# Patient Record
Sex: MALE | Race: WHITE | NOT HISPANIC OR LATINO | URBAN - METROPOLITAN AREA
[De-identification: names, ages, dates, MRNs, and addresses within clinical notes are randomized per-mention and may not be internally consistent; named-entity substitution may affect disease eponyms.]

---

## 2017-03-30 ENCOUNTER — INPATIENT (INPATIENT)
Facility: HOSPITAL | Age: 82
LOS: 7 days | Discharge: HOME | DRG: 85 | End: 2017-04-07
Attending: INTERNAL MEDICINE | Admitting: INTERNAL MEDICINE
Payer: MEDICARE

## 2017-03-30 VITALS
OXYGEN SATURATION: 94 % | SYSTOLIC BLOOD PRESSURE: 163 MMHG | TEMPERATURE: 98 F | DIASTOLIC BLOOD PRESSURE: 71 MMHG | RESPIRATION RATE: 20 BRPM | HEART RATE: 60 BPM

## 2017-03-30 DIAGNOSIS — Z98.890 OTHER SPECIFIED POSTPROCEDURAL STATES: Chronic | ICD-10-CM

## 2017-03-30 DIAGNOSIS — Z95.1 PRESENCE OF AORTOCORONARY BYPASS GRAFT: Chronic | ICD-10-CM

## 2017-03-30 DIAGNOSIS — R29.6 REPEATED FALLS: ICD-10-CM

## 2017-03-30 PROBLEM — Z00.00 ENCOUNTER FOR PREVENTIVE HEALTH EXAMINATION: Status: ACTIVE | Noted: 2017-03-30

## 2017-03-30 LAB
ALBUMIN SERPL ELPH-MCNC: 4.4 G/DL — SIGNIFICANT CHANGE UP (ref 3.3–5)
ALP SERPL-CCNC: 95 U/L — SIGNIFICANT CHANGE UP (ref 40–120)
ALT FLD-CCNC: 34 U/L RC — SIGNIFICANT CHANGE UP (ref 10–45)
ANION GAP SERPL CALC-SCNC: 14 MMOL/L — SIGNIFICANT CHANGE UP (ref 5–17)
APTT BLD: 38.1 SEC — HIGH (ref 27.5–37.4)
AST SERPL-CCNC: 28 U/L — SIGNIFICANT CHANGE UP (ref 10–40)
BASOPHILS # BLD AUTO: 0 K/UL — SIGNIFICANT CHANGE UP (ref 0–0.2)
BASOPHILS NFR BLD AUTO: 0.1 % — SIGNIFICANT CHANGE UP (ref 0–2)
BILIRUB SERPL-MCNC: 1.5 MG/DL — HIGH (ref 0.2–1.2)
BUN SERPL-MCNC: 27 MG/DL — HIGH (ref 7–23)
CALCIUM SERPL-MCNC: 9.6 MG/DL — SIGNIFICANT CHANGE UP (ref 8.4–10.5)
CHLORIDE SERPL-SCNC: 95 MMOL/L — LOW (ref 96–108)
CO2 SERPL-SCNC: 24 MMOL/L — SIGNIFICANT CHANGE UP (ref 22–31)
CREAT SERPL-MCNC: 1.2 MG/DL — SIGNIFICANT CHANGE UP (ref 0.5–1.3)
EOSINOPHIL # BLD AUTO: 0.2 K/UL — SIGNIFICANT CHANGE UP (ref 0–0.5)
EOSINOPHIL NFR BLD AUTO: 1.9 % — SIGNIFICANT CHANGE UP (ref 0–6)
GAS PNL BLDV: SIGNIFICANT CHANGE UP
GLUCOSE SERPL-MCNC: 125 MG/DL — HIGH (ref 70–99)
HCT VFR BLD CALC: 41.3 % — SIGNIFICANT CHANGE UP (ref 39–50)
HGB BLD-MCNC: 14.3 G/DL — SIGNIFICANT CHANGE UP (ref 13–17)
INR BLD: 2.68 RATIO — HIGH (ref 0.88–1.16)
LYMPHOCYTES # BLD AUTO: 0.5 K/UL — LOW (ref 1–3.3)
LYMPHOCYTES # BLD AUTO: 5.8 % — LOW (ref 13–44)
MAGNESIUM SERPL-MCNC: 2.8 MG/DL — HIGH (ref 1.6–2.6)
MCHC RBC-ENTMCNC: 33.6 PG — SIGNIFICANT CHANGE UP (ref 27–34)
MCHC RBC-ENTMCNC: 34.6 GM/DL — SIGNIFICANT CHANGE UP (ref 32–36)
MCV RBC AUTO: 97.1 FL — SIGNIFICANT CHANGE UP (ref 80–100)
MONOCYTES # BLD AUTO: 0.8 K/UL — SIGNIFICANT CHANGE UP (ref 0–0.9)
MONOCYTES NFR BLD AUTO: 8.5 % — SIGNIFICANT CHANGE UP (ref 2–14)
NEUTROPHILS # BLD AUTO: 7.7 K/UL — HIGH (ref 1.8–7.4)
NEUTROPHILS NFR BLD AUTO: 83.7 % — HIGH (ref 43–77)
PHOSPHATE SERPL-MCNC: 2.5 MG/DL — SIGNIFICANT CHANGE UP (ref 2.5–4.5)
PLATELET # BLD AUTO: 116 K/UL — LOW (ref 150–400)
POTASSIUM SERPL-MCNC: 4.1 MMOL/L — SIGNIFICANT CHANGE UP (ref 3.5–5.3)
POTASSIUM SERPL-SCNC: 4.1 MMOL/L — SIGNIFICANT CHANGE UP (ref 3.5–5.3)
PROT SERPL-MCNC: 7.1 G/DL — SIGNIFICANT CHANGE UP (ref 6–8.3)
PROTHROM AB SERPL-ACNC: 29.8 SEC — HIGH (ref 9.8–12.7)
RBC # BLD: 4.26 M/UL — SIGNIFICANT CHANGE UP (ref 4.2–5.8)
RBC # FLD: 12.5 % — SIGNIFICANT CHANGE UP (ref 10.3–14.5)
SODIUM SERPL-SCNC: 133 MMOL/L — LOW (ref 135–145)
WBC # BLD: 9.2 K/UL — SIGNIFICANT CHANGE UP (ref 3.8–10.5)
WBC # FLD AUTO: 9.2 K/UL — SIGNIFICANT CHANGE UP (ref 3.8–10.5)

## 2017-03-30 PROCEDURE — 93010 ELECTROCARDIOGRAM REPORT: CPT | Mod: GC

## 2017-03-30 PROCEDURE — 99285 EMERGENCY DEPT VISIT HI MDM: CPT | Mod: 25,GC

## 2017-03-30 PROCEDURE — 72125 CT NECK SPINE W/O DYE: CPT | Mod: 26

## 2017-03-30 PROCEDURE — 70450 CT HEAD/BRAIN W/O DYE: CPT | Mod: 26

## 2017-03-30 PROCEDURE — 71010: CPT | Mod: 26

## 2017-03-30 RX ORDER — ACETAMINOPHEN 500 MG
1000 TABLET ORAL ONCE
Qty: 0 | Refills: 0 | Status: COMPLETED | OUTPATIENT
Start: 2017-03-30 | End: 2017-03-30

## 2017-03-30 RX ORDER — LOSARTAN POTASSIUM 100 MG/1
1 TABLET, FILM COATED ORAL
Qty: 0 | Refills: 0 | COMMUNITY

## 2017-03-30 RX ORDER — LOSARTAN POTASSIUM 100 MG/1
0 TABLET, FILM COATED ORAL
Qty: 0 | Refills: 0 | COMMUNITY

## 2017-03-30 RX ORDER — BUDESONIDE AND FORMOTEROL FUMARATE DIHYDRATE 160; 4.5 UG/1; UG/1
0 AEROSOL RESPIRATORY (INHALATION)
Qty: 0 | Refills: 0 | COMMUNITY

## 2017-03-30 RX ORDER — METOPROLOL TARTRATE 50 MG
0 TABLET ORAL
Qty: 0 | Refills: 0 | COMMUNITY

## 2017-03-30 RX ORDER — HYDRALAZINE HCL 50 MG
0 TABLET ORAL
Qty: 0 | Refills: 0 | COMMUNITY

## 2017-03-30 RX ORDER — SODIUM CHLORIDE 9 MG/ML
1000 INJECTION INTRAMUSCULAR; INTRAVENOUS; SUBCUTANEOUS ONCE
Qty: 0 | Refills: 0 | Status: COMPLETED | OUTPATIENT
Start: 2017-03-30 | End: 2017-03-30

## 2017-03-30 RX ORDER — ASPIRIN/CALCIUM CARB/MAGNESIUM 324 MG
0 TABLET ORAL
Qty: 0 | Refills: 0 | COMMUNITY

## 2017-03-30 RX ORDER — WARFARIN SODIUM 2.5 MG/1
0 TABLET ORAL
Qty: 0 | Refills: 0 | COMMUNITY

## 2017-03-30 RX ORDER — TERAZOSIN HYDROCHLORIDE 10 MG/1
0 CAPSULE ORAL
Qty: 0 | Refills: 0 | COMMUNITY

## 2017-03-30 RX ORDER — SIMVASTATIN 20 MG/1
0 TABLET, FILM COATED ORAL
Qty: 0 | Refills: 0 | COMMUNITY

## 2017-03-30 RX ORDER — RANITIDINE HYDROCHLORIDE 150 MG/1
0 TABLET, FILM COATED ORAL
Qty: 0 | Refills: 0 | COMMUNITY

## 2017-03-30 RX ADMIN — Medication 400 MILLIGRAM(S): at 21:58

## 2017-03-30 RX ADMIN — SODIUM CHLORIDE 2000 MILLILITER(S): 9 INJECTION INTRAMUSCULAR; INTRAVENOUS; SUBCUTANEOUS at 21:58

## 2017-03-30 RX ADMIN — Medication 1000 MILLIGRAM(S): at 23:04

## 2017-03-30 NOTE — ED PROVIDER NOTE - MEDICAL DECISION MAKING DETAILS
86 year old male with history of AFib, CAD, on coumadin, presenting with frequent falls since yesterday, on exam is alert and oriented with no focal deficits and bruising to top of head, will check CT, labs, and urine, reassess 86 year old male with history of AFib, CAD, on coumadin, presenting with frequent falls since yesterday, on exam is alert and oriented with no focal deficits and bruising to top of head, will check CT, labs, and urine, reassess / ATTD agree with abvoe.

## 2017-03-30 NOTE — ED ADULT NURSE NOTE - PMH
COPD (chronic obstructive pulmonary disease)    Heart murmur    High cholesterol    HTN (hypertension)    Pacemaker    Stented coronary artery

## 2017-03-30 NOTE — ED ADULT NURSE NOTE - OBJECTIVE STATEMENT
as per pt family, wife states, " last week pt started having shooting head pains and yesterday fell while walking in the house. today pt fell an additional x3 times and hit the back of the head while trying to get up." pt is on warfarin for afib. pt feels dizzy and lightheaded at present with blurry vision in both eyes. pt walks with unsteady gait. pt denies LOC

## 2017-03-30 NOTE — ED PROVIDER NOTE - ATTENDING CONTRIBUTION TO CARE
87 y/o m with pmhx HTN, BPH, HLD, Afib on coumadin, CAD s/p stents, presents with 3 falls over the last 2 days/ came from [place] where he lives for a wedding. Saw his PMD in [place] a few days ago who thought the etiology of headache was from neck stiffness, had his INR checked which was low and his coumadin dosage increased.  Per wife and daughter at bedside, he saw the grain in a wooden table and thought it was ants, he fell 3 times today and then once today, not sure if he hit his head. Complaining of occipital headache and dizziness. Denies chest pain, no dyspnea, no nausea. Cardiologist Melvin Damico no other complaints.   Gen. no acute distress, Non toxic GCS 15. primary and secondary survey done.    HEENT: EOMI, mmm, area of ecchymosis on post scalp superficial. no step off or tend. no Racoon no Merida sign, no hemotympanum neck supple and full range of motion.   Lungs: CTAB/L no C/ W /R   CVS: S1S2   Abd; Soft non tender, non distended   Ext: no edema , superficial abrasion on right leg.   Neuro AAOx3 non focal clear speech

## 2017-03-30 NOTE — ED PROVIDER NOTE - OBJECTIVE STATEMENT
86 year old male with PMHx of HTN, BPH, HLD, Afib on coumadin, CAD s/p stents, presenting with frequent falls at home. Has been having headaches for about a week, saw his PMD in [place] a few days ago who thought the etiology was from neck stiffness, had his INR checked which was low and his coumadin dosage increased. He has been acting "not right" per family (at bedside) for the past few days - he saw the grain in a wooden table and thought it was ants, he fell 3 times today and then once today, not sure if he hit his head. Complaining of occipital headache and dizziness. Denies chest pain, no dyspnea, no nausea. Cardiologist Melvin Damico

## 2017-03-31 DIAGNOSIS — I48.0 PAROXYSMAL ATRIAL FIBRILLATION: ICD-10-CM

## 2017-03-31 DIAGNOSIS — R29.6 REPEATED FALLS: ICD-10-CM

## 2017-03-31 DIAGNOSIS — I10 ESSENTIAL (PRIMARY) HYPERTENSION: ICD-10-CM

## 2017-03-31 DIAGNOSIS — R51 HEADACHE: ICD-10-CM

## 2017-03-31 DIAGNOSIS — J44.9 CHRONIC OBSTRUCTIVE PULMONARY DISEASE, UNSPECIFIED: ICD-10-CM

## 2017-03-31 DIAGNOSIS — Z95.0 PRESENCE OF CARDIAC PACEMAKER: ICD-10-CM

## 2017-03-31 LAB
ALBUMIN SERPL ELPH-MCNC: 4.2 G/DL — SIGNIFICANT CHANGE UP (ref 3.3–5)
ALP SERPL-CCNC: 78 U/L — SIGNIFICANT CHANGE UP (ref 40–120)
ALT FLD-CCNC: 31 U/L RC — SIGNIFICANT CHANGE UP (ref 10–45)
ANION GAP SERPL CALC-SCNC: 15 MMOL/L — SIGNIFICANT CHANGE UP (ref 5–17)
APPEARANCE UR: CLEAR — SIGNIFICANT CHANGE UP
APPEARANCE UR: CLEAR — SIGNIFICANT CHANGE UP
APTT BLD: 38 SEC — HIGH (ref 27.5–37.4)
AST SERPL-CCNC: 32 U/L — SIGNIFICANT CHANGE UP (ref 10–40)
BASOPHILS # BLD AUTO: 0 K/UL — SIGNIFICANT CHANGE UP (ref 0–0.2)
BASOPHILS NFR BLD AUTO: 0.2 % — SIGNIFICANT CHANGE UP (ref 0–2)
BILIRUB SERPL-MCNC: 1.3 MG/DL — HIGH (ref 0.2–1.2)
BILIRUB UR-MCNC: NEGATIVE — SIGNIFICANT CHANGE UP
BILIRUB UR-MCNC: NEGATIVE — SIGNIFICANT CHANGE UP
BUN SERPL-MCNC: 36 MG/DL — HIGH (ref 7–23)
CALCIUM SERPL-MCNC: 8.9 MG/DL — SIGNIFICANT CHANGE UP (ref 8.4–10.5)
CHLORIDE SERPL-SCNC: 97 MMOL/L — SIGNIFICANT CHANGE UP (ref 96–108)
CK MB BLD-MCNC: 1.6 % — SIGNIFICANT CHANGE UP (ref 0–3.5)
CK MB BLD-MCNC: 3.2 % — SIGNIFICANT CHANGE UP (ref 0–3.5)
CK MB CFR SERPL CALC: 7.1 NG/ML — HIGH (ref 0–6.7)
CK MB CFR SERPL CALC: 8.4 NG/ML — HIGH (ref 0–6.7)
CK MB CFR SERPL CALC: 8.7 NG/ML — HIGH (ref 0–6.7)
CK SERPL-CCNC: 262 U/L — HIGH (ref 30–200)
CK SERPL-CCNC: 291 U/L — HIGH (ref 30–200)
CK SERPL-CCNC: 442 U/L — HIGH (ref 30–200)
CO2 SERPL-SCNC: 21 MMOL/L — LOW (ref 22–31)
COLOR SPEC: YELLOW — SIGNIFICANT CHANGE UP
COLOR SPEC: YELLOW — SIGNIFICANT CHANGE UP
CREAT SERPL-MCNC: 1.13 MG/DL — SIGNIFICANT CHANGE UP (ref 0.5–1.3)
DIFF PNL FLD: ABNORMAL
DIFF PNL FLD: ABNORMAL
EOSINOPHIL # BLD AUTO: 0 K/UL — SIGNIFICANT CHANGE UP (ref 0–0.5)
EOSINOPHIL NFR BLD AUTO: 0.2 % — SIGNIFICANT CHANGE UP (ref 0–6)
EPI CELLS # UR: SIGNIFICANT CHANGE UP /HPF
GLUCOSE SERPL-MCNC: 153 MG/DL — HIGH (ref 70–99)
GLUCOSE UR QL: NEGATIVE — SIGNIFICANT CHANGE UP
GLUCOSE UR QL: NEGATIVE — SIGNIFICANT CHANGE UP
HCT VFR BLD CALC: 38.3 % — LOW (ref 39–50)
HGB BLD-MCNC: 13.7 G/DL — SIGNIFICANT CHANGE UP (ref 13–17)
INR BLD: 2.38 RATIO — HIGH (ref 0.88–1.16)
KETONES UR-MCNC: NEGATIVE — SIGNIFICANT CHANGE UP
KETONES UR-MCNC: NEGATIVE — SIGNIFICANT CHANGE UP
LACTATE SERPL-SCNC: 1.4 MMOL/L — SIGNIFICANT CHANGE UP (ref 0.7–2)
LEUKOCYTE ESTERASE UR-ACNC: NEGATIVE — SIGNIFICANT CHANGE UP
LEUKOCYTE ESTERASE UR-ACNC: NEGATIVE — SIGNIFICANT CHANGE UP
LYMPHOCYTES # BLD AUTO: 0.6 K/UL — LOW (ref 1–3.3)
LYMPHOCYTES # BLD AUTO: 5 % — LOW (ref 13–44)
MAGNESIUM SERPL-MCNC: 2.2 MG/DL — SIGNIFICANT CHANGE UP (ref 1.6–2.6)
MCHC RBC-ENTMCNC: 34.2 PG — HIGH (ref 27–34)
MCHC RBC-ENTMCNC: 35.7 GM/DL — SIGNIFICANT CHANGE UP (ref 32–36)
MCV RBC AUTO: 96 FL — SIGNIFICANT CHANGE UP (ref 80–100)
MONOCYTES # BLD AUTO: 1 K/UL — HIGH (ref 0–0.9)
MONOCYTES NFR BLD AUTO: 8.2 % — SIGNIFICANT CHANGE UP (ref 2–14)
NEUTROPHILS # BLD AUTO: 10.3 K/UL — HIGH (ref 1.8–7.4)
NEUTROPHILS NFR BLD AUTO: 86.4 % — HIGH (ref 43–77)
NITRITE UR-MCNC: NEGATIVE — SIGNIFICANT CHANGE UP
NITRITE UR-MCNC: NEGATIVE — SIGNIFICANT CHANGE UP
PH UR: 6 — SIGNIFICANT CHANGE UP (ref 4.8–8)
PH UR: 6 — SIGNIFICANT CHANGE UP (ref 4.8–8)
PLATELET # BLD AUTO: 104 K/UL — LOW (ref 150–400)
POTASSIUM SERPL-MCNC: 4.6 MMOL/L — SIGNIFICANT CHANGE UP (ref 3.5–5.3)
POTASSIUM SERPL-SCNC: 4.6 MMOL/L — SIGNIFICANT CHANGE UP (ref 3.5–5.3)
PROT SERPL-MCNC: 6.3 G/DL — SIGNIFICANT CHANGE UP (ref 6–8.3)
PROT UR-MCNC: 100 MG/DL
PROT UR-MCNC: 300 MG/DL
PROTHROM AB SERPL-ACNC: 27.4 SEC — HIGH (ref 10–13.1)
RBC # BLD: 3.99 M/UL — LOW (ref 4.2–5.8)
RBC # FLD: 13 % — SIGNIFICANT CHANGE UP (ref 10.3–14.5)
RBC CASTS # UR COMP ASSIST: ABNORMAL /HPF (ref 0–2)
SODIUM SERPL-SCNC: 133 MMOL/L — LOW (ref 135–145)
SP GR SPEC: 1.02 — SIGNIFICANT CHANGE UP (ref 1.01–1.02)
SP GR SPEC: 1.03 — HIGH (ref 1.01–1.02)
TROPONIN T SERPL-MCNC: 0.02 NG/ML — SIGNIFICANT CHANGE UP (ref 0–0.06)
TROPONIN T SERPL-MCNC: <0.01 NG/ML — SIGNIFICANT CHANGE UP (ref 0–0.06)
TROPONIN T SERPL-MCNC: <0.01 NG/ML — SIGNIFICANT CHANGE UP (ref 0–0.06)
UROBILINOGEN FLD QL: NEGATIVE — SIGNIFICANT CHANGE UP
UROBILINOGEN FLD QL: NEGATIVE — SIGNIFICANT CHANGE UP
WBC # BLD: 11.9 K/UL — HIGH (ref 3.8–10.5)
WBC # FLD AUTO: 11.9 K/UL — HIGH (ref 3.8–10.5)
WBC UR QL: SIGNIFICANT CHANGE UP /HPF (ref 0–5)

## 2017-03-31 PROCEDURE — 99223 1ST HOSP IP/OBS HIGH 75: CPT

## 2017-03-31 RX ORDER — LOSARTAN POTASSIUM 100 MG/1
25 TABLET, FILM COATED ORAL
Qty: 0 | Refills: 0 | Status: DISCONTINUED | OUTPATIENT
Start: 2017-03-31 | End: 2017-04-01

## 2017-03-31 RX ORDER — ACETAMINOPHEN 500 MG
650 TABLET ORAL ONCE
Qty: 0 | Refills: 0 | Status: COMPLETED | OUTPATIENT
Start: 2017-03-31 | End: 2017-03-31

## 2017-03-31 RX ORDER — BUDESONIDE AND FORMOTEROL FUMARATE DIHYDRATE 160; 4.5 UG/1; UG/1
2 AEROSOL RESPIRATORY (INHALATION)
Qty: 0 | Refills: 0 | Status: DISCONTINUED | OUTPATIENT
Start: 2017-03-31 | End: 2017-04-07

## 2017-03-31 RX ORDER — BUDESONIDE AND FORMOTEROL FUMARATE DIHYDRATE 160; 4.5 UG/1; UG/1
2 AEROSOL RESPIRATORY (INHALATION)
Qty: 0 | Refills: 0 | COMMUNITY

## 2017-03-31 RX ORDER — METOPROLOL TARTRATE 50 MG
25 TABLET ORAL AT BEDTIME
Qty: 0 | Refills: 0 | Status: DISCONTINUED | OUTPATIENT
Start: 2017-03-31 | End: 2017-04-02

## 2017-03-31 RX ORDER — ACETAMINOPHEN 500 MG
650 TABLET ORAL EVERY 6 HOURS
Qty: 0 | Refills: 0 | Status: DISCONTINUED | OUTPATIENT
Start: 2017-03-31 | End: 2017-04-07

## 2017-03-31 RX ORDER — HYDRALAZINE HCL 50 MG
50 TABLET ORAL
Qty: 0 | Refills: 0 | Status: DISCONTINUED | OUTPATIENT
Start: 2017-03-31 | End: 2017-04-01

## 2017-03-31 RX ORDER — FAMOTIDINE 10 MG/ML
20 INJECTION INTRAVENOUS DAILY
Qty: 0 | Refills: 0 | Status: DISCONTINUED | OUTPATIENT
Start: 2017-03-31 | End: 2017-04-07

## 2017-03-31 RX ORDER — CARBIDOPA AND LEVODOPA 25; 100 MG/1; MG/1
0.5 TABLET ORAL
Qty: 0 | Refills: 0 | Status: DISCONTINUED | OUTPATIENT
Start: 2017-03-31 | End: 2017-04-05

## 2017-03-31 RX ORDER — SIMVASTATIN 20 MG/1
40 TABLET, FILM COATED ORAL AT BEDTIME
Qty: 0 | Refills: 0 | Status: DISCONTINUED | OUTPATIENT
Start: 2017-03-31 | End: 2017-04-07

## 2017-03-31 RX ORDER — DOXAZOSIN MESYLATE 4 MG
2 TABLET ORAL AT BEDTIME
Qty: 0 | Refills: 0 | Status: DISCONTINUED | OUTPATIENT
Start: 2017-03-31 | End: 2017-04-07

## 2017-03-31 RX ORDER — ASPIRIN/CALCIUM CARB/MAGNESIUM 324 MG
81 TABLET ORAL DAILY
Qty: 0 | Refills: 0 | Status: DISCONTINUED | OUTPATIENT
Start: 2017-03-31 | End: 2017-04-04

## 2017-03-31 RX ADMIN — Medication 25 MILLIGRAM(S): at 22:58

## 2017-03-31 RX ADMIN — Medication 50 MILLIGRAM(S): at 17:49

## 2017-03-31 RX ADMIN — FAMOTIDINE 20 MILLIGRAM(S): 10 INJECTION INTRAVENOUS at 11:45

## 2017-03-31 RX ADMIN — BUDESONIDE AND FORMOTEROL FUMARATE DIHYDRATE 2 PUFF(S): 160; 4.5 AEROSOL RESPIRATORY (INHALATION) at 17:49

## 2017-03-31 RX ADMIN — Medication 2 MILLIGRAM(S): at 22:57

## 2017-03-31 RX ADMIN — LOSARTAN POTASSIUM 25 MILLIGRAM(S): 100 TABLET, FILM COATED ORAL at 06:36

## 2017-03-31 RX ADMIN — LOSARTAN POTASSIUM 25 MILLIGRAM(S): 100 TABLET, FILM COATED ORAL at 17:49

## 2017-03-31 RX ADMIN — Medication 81 MILLIGRAM(S): at 11:45

## 2017-03-31 RX ADMIN — SIMVASTATIN 40 MILLIGRAM(S): 20 TABLET, FILM COATED ORAL at 22:57

## 2017-03-31 RX ADMIN — BUDESONIDE AND FORMOTEROL FUMARATE DIHYDRATE 2 PUFF(S): 160; 4.5 AEROSOL RESPIRATORY (INHALATION) at 06:35

## 2017-03-31 RX ADMIN — CARBIDOPA AND LEVODOPA 0.5 TABLET(S): 25; 100 TABLET ORAL at 22:58

## 2017-03-31 RX ADMIN — Medication 650 MILLIGRAM(S): at 21:14

## 2017-03-31 RX ADMIN — Medication 50 MILLIGRAM(S): at 06:36

## 2017-03-31 NOTE — PHYSICAL THERAPY INITIAL EVALUATION ADULT - PLANNED THERAPY INTERVENTIONS, PT EVAL
strengthening/bed mobility training/transfer training/gait training/postural re-education/balance training

## 2017-03-31 NOTE — H&P ADULT. - LAB RESULTS AND INTERPRETATION
WBC 9.2.  Hgb 14.3.  Platelets of 116K.  INR 2.6.  K+ 4.1.  Random glucose of 125.  Cr 1.2.  Alb 4.4  Mg++ 2.8.

## 2017-03-31 NOTE — PHYSICAL THERAPY INITIAL EVALUATION ADULT - PERTINENT HX OF CURRENT PROBLEM, REHAB EVAL
87 y/o M previously a resident of [place] but has resided in [place] for the past 10 years with a history of remote CABG in [place], PAF on Coumadin, past PCI, PPM, reported COPD (no prior primary or secondary tobacco exposure) but from a childhood complication of empyema, with the spouse noting that patient has been complaining of an occipital H/A since this Sunday, and 4 falls in past 2 days 85 y/o M previously a resident of [place] but has resided in [place] for the past 10 years with a history of remote CABG in [place], PAF on Coumadin, past PCI, PPM, reported COPD (no prior primary or secondary tobacco exposure) but from a childhood complication of empyema, with the spouse noting that patient has been complaining of an occipital H/A since this Sunday, and 4 falls in past 2 days. CT head/Cspine (3/30): (-)

## 2017-03-31 NOTE — PHYSICAL THERAPY INITIAL EVALUATION ADULT - ADDITIONAL COMMENTS
Pt lives with spouse in [place], is currently in [place] for a wedding, staying at daughter's house. Pt's daughter lives in a ranch with 2-3 stairs to enter (+) HR. Pt ambulates with a rolling walker at baseline.

## 2017-03-31 NOTE — H&P ADULT. - HISTORY OF PRESENT ILLNESS
NIGHT HOSPITALIST:  Patient UNKNOWN to me previously, assigned to me via the ER and by Dr. Sawyer to admit this 87 y/o M--history corroborated with the spouse via telephone --patient previously a resident of [place] but has resided in [place] for the past 10 years with a history of remote CABG in [place], [facility] on Coumadin, past PCI, PPM, reported COPD (no prior primary or secondary tobacco exposure) but from a childhood complication of empyema, with the spouse noting that patient has been complaining of an occipital H/A since this Sunday, with the patient and her spouse arriving in [place] this Tuesday for a local wedding.  Patient reports that he fell 2 days ago and yesterday a total of 4 times--spouse notes that the third time the patient fell on the back of his head, and with falls to his RIGHT leg.  Patient denies LOC.  NO N/V.  No HA at present, no focal weakness.  Spouse notes that the last episode yesterday patient had fallen in the [place] and patient had accidentally blocked himself against the door but was able to free himself from the door.  No chest pain/pressure.  No dyspnea.  No abdominal pain, no red blood per rectum.  No back pain, no tearing back pain.  No fever, no chills, no rigors.  No dysuria.  No hematuria.  Denies weight loss or anorexia.  Remaining review of systems not contributory.

## 2017-03-31 NOTE — H&P ADULT. - EYES COMMENTS
No Merida's.  No raccoon eye. No Merida's.  No raccoon eye.  3 cm shallow ecchymoses posterior aspect scalp.

## 2017-03-31 NOTE — H&P ADULT. - RADIOLOGY RESULTS AND INTERPRETATION
Chest radiograph with PPM, no infiltrate or effusion.  Reviewed head CTT and cervical CTT with radiologist with no bleed, no mass, no acute CVA.

## 2017-03-31 NOTE — H&P ADULT. - RS GEN PE MLT RESP DETAILS PC
no intercostal retractions/no chest wall tenderness/no wheezes/no rhonchi/respirations non-labored/no rales/clear to auscultation bilaterally/good air movement/airway patent/breath sounds equal/no subcutaneous emphysema

## 2017-03-31 NOTE — H&P ADULT. - GASTROINTESTINAL DETAILS
no bruit/soft/bowel sounds normal/no guarding/no distention/no rebound tenderness/nontender/no rigidity/no organomegaly

## 2017-03-31 NOTE — H&P ADULT. - MENTAL STATUS
Alert, oriented to person/place/year.  Poor short term memory recall.  Interactive with examiner with no need for prompting but limited cognitive assessment.

## 2017-03-31 NOTE — INPATIENT CERTIFICATION FOR MEDICARE PATIENTS - RISKS OF ADVERSE EVENTS
Concern for neurologic deterioration/Concern for cardiopulmonary deterioration/Concern for delay in diagnosis and treatment

## 2017-03-31 NOTE — H&P ADULT. - ASSESSMENT
NIGHT HOSPITALIST;  Presentation of patient with intermittent occipital H/A since this past Sunday (no reported prior trauma until 2 days prior) in the setting of patient with PAF on Coumadin, CABG, PPM, essential HTN on multiple medications for BP control.  Patient had already received his Coumadin dose from last night by spouse--spouse aware and agrees to risks/benefits of continued anticoagulation in the setting of PAF-- I have requested a formal neurologic evaluation overnight.  MRI is precluded due to patient's PPM.  Would consider EP assessment of PPM.  Patient with no Rx that should explain the confusional state nor presently have evidence of occult infection as such>>will follow orthostatics in the setting of BP medications.  Will assume aspiration risk.  Physical therapy/ fall and bleeding precautions.  Enhanced supervision.  Will obtain an echo to assess aortic valves and LV function.  Patient's multiple comorbidities in patient's presentation with guarded prognosis.  Emotional support provided to patient/ spouse.  Spouse / family aware of course and agree with plan/care as above.

## 2017-03-31 NOTE — H&P ADULT. - PROBLEM SELECTOR PLAN 3
ON Coumadin.  Spouse/patient presently agree to continue with anticoagulation as above but will defer to the DAY HOSPITALIST further review of risk/benefits in the setting of the recent frequent falling.

## 2017-03-31 NOTE — PHYSICAL THERAPY INITIAL EVALUATION ADULT - DIAGNOSIS, PT EVAL
Decr. functional mobility 2/2 decr. balance, postural control; cognitive impairment, gait dysfunction

## 2017-03-31 NOTE — H&P ADULT. - ATTENDING COMMENTS
NIGHT HOSPITALIST:  Spouse/ family aware of course and agree with plan/care as above.  Prognosis guarded.  Emotional support provided to patient/ spouse.  Care reviewed with covering NP.  Care assumed by Dr. Sawyer.

## 2017-03-31 NOTE — PHYSICAL THERAPY INITIAL EVALUATION ADULT - GENERAL OBSERVATIONS, REHAB EVAL
Pt received seated in chair, (+) telemetry, NAD. Pt's spouse & daughter at bedside. Pt alert, confused, agreeable to PT eval.

## 2017-04-01 LAB
ANION GAP SERPL CALC-SCNC: 15 MMOL/L — SIGNIFICANT CHANGE UP (ref 5–17)
APTT BLD: 37.9 SEC — HIGH (ref 27.5–37.4)
BUN SERPL-MCNC: 33 MG/DL — HIGH (ref 7–23)
CALCIUM SERPL-MCNC: 8.6 MG/DL — SIGNIFICANT CHANGE UP (ref 8.4–10.5)
CHLORIDE SERPL-SCNC: 98 MMOL/L — SIGNIFICANT CHANGE UP (ref 96–108)
CO2 SERPL-SCNC: 20 MMOL/L — LOW (ref 22–31)
CREAT SERPL-MCNC: 1.02 MG/DL — SIGNIFICANT CHANGE UP (ref 0.5–1.3)
CULTURE RESULTS: SIGNIFICANT CHANGE UP
GLUCOSE SERPL-MCNC: 118 MG/DL — HIGH (ref 70–99)
HCT VFR BLD CALC: 41 % — SIGNIFICANT CHANGE UP (ref 39–50)
HGB BLD-MCNC: 14.1 G/DL — SIGNIFICANT CHANGE UP (ref 13–17)
INR BLD: 2.7 RATIO — HIGH (ref 0.88–1.16)
LACTATE SERPL-SCNC: 1.1 MMOL/L — SIGNIFICANT CHANGE UP (ref 0.7–2)
MCHC RBC-ENTMCNC: 33.5 PG — SIGNIFICANT CHANGE UP (ref 27–34)
MCHC RBC-ENTMCNC: 34.4 GM/DL — SIGNIFICANT CHANGE UP (ref 32–36)
MCV RBC AUTO: 97.4 FL — SIGNIFICANT CHANGE UP (ref 80–100)
PLATELET # BLD AUTO: 96 K/UL — LOW (ref 150–400)
POTASSIUM SERPL-MCNC: 4 MMOL/L — SIGNIFICANT CHANGE UP (ref 3.5–5.3)
POTASSIUM SERPL-SCNC: 4 MMOL/L — SIGNIFICANT CHANGE UP (ref 3.5–5.3)
PROTHROM AB SERPL-ACNC: 29.8 SEC — HIGH (ref 9.8–12.7)
RAPID RVP RESULT: SIGNIFICANT CHANGE UP
RBC # BLD: 4.21 M/UL — SIGNIFICANT CHANGE UP (ref 4.2–5.8)
RBC # FLD: 12.7 % — SIGNIFICANT CHANGE UP (ref 10.3–14.5)
SODIUM SERPL-SCNC: 133 MMOL/L — LOW (ref 135–145)
SPECIMEN SOURCE: SIGNIFICANT CHANGE UP
WBC # BLD: 9.3 K/UL — SIGNIFICANT CHANGE UP (ref 3.8–10.5)
WBC # FLD AUTO: 9.3 K/UL — SIGNIFICANT CHANGE UP (ref 3.8–10.5)

## 2017-04-01 PROCEDURE — 99222 1ST HOSP IP/OBS MODERATE 55: CPT | Mod: GC

## 2017-04-01 PROCEDURE — 99233 SBSQ HOSP IP/OBS HIGH 50: CPT

## 2017-04-01 PROCEDURE — 71250 CT THORAX DX C-: CPT | Mod: 26

## 2017-04-01 RX ORDER — PIPERACILLIN AND TAZOBACTAM 4; .5 G/20ML; G/20ML
3.38 INJECTION, POWDER, LYOPHILIZED, FOR SOLUTION INTRAVENOUS EVERY 8 HOURS
Qty: 0 | Refills: 0 | Status: DISCONTINUED | OUTPATIENT
Start: 2017-04-01 | End: 2017-04-06

## 2017-04-01 RX ORDER — LOSARTAN POTASSIUM 100 MG/1
100 TABLET, FILM COATED ORAL DAILY
Qty: 0 | Refills: 0 | Status: DISCONTINUED | OUTPATIENT
Start: 2017-04-01 | End: 2017-04-07

## 2017-04-01 RX ORDER — HYDRALAZINE HCL 50 MG
50 TABLET ORAL THREE TIMES A DAY
Qty: 0 | Refills: 0 | Status: DISCONTINUED | OUTPATIENT
Start: 2017-04-01 | End: 2017-04-03

## 2017-04-01 RX ORDER — LOSARTAN POTASSIUM 100 MG/1
50 TABLET, FILM COATED ORAL DAILY
Qty: 0 | Refills: 0 | Status: DISCONTINUED | OUTPATIENT
Start: 2017-04-01 | End: 2017-04-01

## 2017-04-01 RX ORDER — SODIUM CHLORIDE 9 MG/ML
1000 INJECTION, SOLUTION INTRAVENOUS
Qty: 0 | Refills: 0 | Status: DISCONTINUED | OUTPATIENT
Start: 2017-04-01 | End: 2017-04-02

## 2017-04-01 RX ADMIN — Medication 50 MILLIGRAM(S): at 21:22

## 2017-04-01 RX ADMIN — BUDESONIDE AND FORMOTEROL FUMARATE DIHYDRATE 2 PUFF(S): 160; 4.5 AEROSOL RESPIRATORY (INHALATION) at 18:59

## 2017-04-01 RX ADMIN — LOSARTAN POTASSIUM 25 MILLIGRAM(S): 100 TABLET, FILM COATED ORAL at 05:52

## 2017-04-01 RX ADMIN — PIPERACILLIN AND TAZOBACTAM 25 GRAM(S): 4; .5 INJECTION, POWDER, LYOPHILIZED, FOR SOLUTION INTRAVENOUS at 21:22

## 2017-04-01 RX ADMIN — Medication 50 MILLIGRAM(S): at 13:01

## 2017-04-01 RX ADMIN — Medication 81 MILLIGRAM(S): at 13:00

## 2017-04-01 RX ADMIN — SODIUM CHLORIDE 50 MILLILITER(S): 9 INJECTION, SOLUTION INTRAVENOUS at 18:59

## 2017-04-01 RX ADMIN — BUDESONIDE AND FORMOTEROL FUMARATE DIHYDRATE 2 PUFF(S): 160; 4.5 AEROSOL RESPIRATORY (INHALATION) at 05:53

## 2017-04-01 RX ADMIN — CARBIDOPA AND LEVODOPA 0.5 TABLET(S): 25; 100 TABLET ORAL at 05:53

## 2017-04-01 RX ADMIN — FAMOTIDINE 20 MILLIGRAM(S): 10 INJECTION INTRAVENOUS at 13:00

## 2017-04-01 RX ADMIN — SIMVASTATIN 40 MILLIGRAM(S): 20 TABLET, FILM COATED ORAL at 21:22

## 2017-04-01 RX ADMIN — CARBIDOPA AND LEVODOPA 0.5 TABLET(S): 25; 100 TABLET ORAL at 17:48

## 2017-04-01 RX ADMIN — Medication 2 MILLIGRAM(S): at 21:22

## 2017-04-01 RX ADMIN — Medication 25 MILLIGRAM(S): at 21:22

## 2017-04-01 RX ADMIN — Medication 50 MILLIGRAM(S): at 05:52

## 2017-04-01 NOTE — PROVIDER CONTACT NOTE (OTHER) - ACTION/TREATMENT ORDERED:
NP ordered tylenol suppository for fever, RN adminstered. NP ordered RN to retake rectal temp in an hour, Rectal temp at 2215 was 100.5. NP ordered blood cultures, lactate, UA and urine culture. NP ordered tylenol suppository for fever, RN adminstered. NP ordered RN to reasses Rectal temp in an hour NP ordered blood cultures, lactate, UA and urine culture. Will  continue to monitor to monitor

## 2017-04-01 NOTE — SWALLOW BEDSIDE ASSESSMENT ADULT - ASR SWALLOW RECOMMEND DIAG
Patient would benefit from FEES given possibility of redundant tissue, however refuses same 2/2 historical intolerance of procedure. Pt amenable to MBS. MD, PLEASE ENTER ORDER FOR MODIFIED BARIUM SWALLOW STUDY (ENTER UNDER RADIOLOGY EXAMS THE FOLLOWING WAY: GO TO THE BROWSER AND TYPE IN XRAY, THEN HIT THE SPACEBAR, AND TYPE IN THE LETTER M.)  WILL SCHEDULE EXAM UPON RECEIPT IN RADIOLOGY.

## 2017-04-01 NOTE — SWALLOW BEDSIDE ASSESSMENT ADULT - NS ASR SWALLOW FINDINGS DISCUS
Nursing/Patient/RN, LILI Cagle, attempted to contact pt's spouse via telephone @ number provided by pt, however unable to reach

## 2017-04-01 NOTE — SWALLOW BEDSIDE ASSESSMENT ADULT - COMMENTS
Hx contd: Per Assessment Presentation of pt w/ intermittent occipital H/A since this past Sunday (no reported prior trauma until 2 days prior) in the setting of pt with PAF on Coumadin, CABG, PPM, essential HTN on multiple medications for BP control. Pt had already received his Coumadin dose from last night by spouse--spouse aware and agrees to risks/benefits of continued anticoagulation in the setting of PAF- formal neuro eval requested. MRI is precluded due to pt's PPM. Would consider EP assessment of PPM. Pt with no Rx that should explain the confusional state nor presently have evidence of occult infection as such>>will follow orthostatics in the setting of BP medications. Will assume aspiration risk. PT/ fall and bleeding precautions. Enhanced supervision. Will obtain an echo to assess aortic valves and LV function. Pt's multiple comorbidities in pt's presentation with guarded prognosis. Dx: Frequent falls, Headache, PAF. HC: 3/30 CXR: Clear lungs. HEAD CT: No acute territorial infarct, hemorrhage, mass or mass effect. C-SPINE CT: Degenerative changes without evidence of a fracture. 3/31 Seen by Cards, rx neuro eval to see if there is a reversible cause to frequent falls. C/w coumadin, ASA, antiHTN meds. Check echo. Seen by Neuro, possible Parkinson's disease given asymetric tremor. Rx repeat CT Head, consider trial of sinemet. 4/1 Per provider contact note Elevated Rectal Temp, Mews 7. NP ordered tylenol suppository for fever, RN adminstered. NP ordered RN to reasses Rectal temp in an hour NP ordered blood cultures, lactate, UA and urine culture. Will  continue to monitor to monitor. Per cards, PPM interrogation appreciated SVT episode 12/2016, otherwise (-). CE (-) thus far, CK elevated 2/2 fall but trop (-). Rx check TTE, monitor electrolytes. Hx contd: Per Assessment Presentation of pt w/ intermittent occipital H/A since this past Sunday (no reported prior trauma until 2 days prior) in the setting of pt with PAF on Coumadin, CABG, PPM, essential HTN on multiple medications for BP control. Pt had already received his Coumadin dose from last night by spouse--spouse aware and agrees to risks/benefits of continued anticoagulation in the setting of PAF- formal neuro eval requested. MRI is precluded due to pt's PPM. Would consider EP assessment of PPM. Pt with no Rx that should explain the confusional state nor presently have evidence of occult infection as such>>will follow orthostatics in the setting of BP medications. Will assume aspiration risk. PT/ fall and bleeding precautions. Enhanced supervision. Will obtain an echo to assess aortic valves and LV function. Pt's multiple comorbidities in pt's presentation with guarded prognosis. Dx: Frequent falls, Headache, PAF. HC: 3/30 CXR: Clear lungs. HEAD CT: No acute territorial infarct, hemorrhage, mass or mass effect. C-SPINE CT: Degenerative changes without evidence of a fracture. 3/31 Seen by Cards, rx neuro eval to see if there is a reversible cause to frequent falls. C/w coumadin, ASA, antiHTN meds. Check echo. Seen by Neuro, possible Parkinson's disease given asymmetric tremor. Rx repeat CT Head, consider trial of sinemet. 4/1 Per provider contact note Elevated Rectal Temp, Mews 7. NP ordered tylenol suppository for fever, RN adminstered. NP ordered RN to reasses Rectal temp in an hour NP ordered blood cultures, lactate, UA and urine culture. Will  continue to monitor to monitor. Per cards, PPM interrogation appreciated SVT episode 12/2016, otherwise (-). CE (-) thus far, CK elevated 2/2 fall but trop (-). Rx check TTE, monitor electrolytes.

## 2017-04-01 NOTE — SWALLOW BEDSIDE ASSESSMENT ADULT - PHARYNGEAL PHASE
1-2 repeat swallows, wet throat clearing post intake of thick puree/Decreased laryngeal elevation/Multiple swallows/Delayed pharyngeal swallow throat clear post intake of thin liquids/Delayed pharyngeal swallow/Multiple swallows/Decreased laryngeal elevation

## 2017-04-01 NOTE — SWALLOW BEDSIDE ASSESSMENT ADULT - SLP GENERAL OBSERVATIONS
Patient encountered supine in bed, receiving 2L/m O2 via NC, positioned upright for purpose of evaluation. Pt alert and oriented to self, grossly to time and place ("hospital," 'tomorrow is the 1st of May" - self corrected to April). Pt able to make wants and needs known and follow directives for purpose of evaluation. Some confusion noted. Communicative intent noted, pt able to converse in full sentences. Pt occasionally with tangential language. Speech WNL in conversation. +Hoarse vocal quality, baseline for the past year per pt.

## 2017-04-02 LAB
ANION GAP SERPL CALC-SCNC: 13 MMOL/L — SIGNIFICANT CHANGE UP (ref 5–17)
BUN SERPL-MCNC: 34 MG/DL — HIGH (ref 7–23)
CALCIUM SERPL-MCNC: 8.9 MG/DL — SIGNIFICANT CHANGE UP (ref 8.4–10.5)
CHLORIDE SERPL-SCNC: 98 MMOL/L — SIGNIFICANT CHANGE UP (ref 96–108)
CHOLEST SERPL-MCNC: 94 MG/DL — SIGNIFICANT CHANGE UP (ref 10–199)
CO2 SERPL-SCNC: 24 MMOL/L — SIGNIFICANT CHANGE UP (ref 22–31)
CREAT SERPL-MCNC: 1.12 MG/DL — SIGNIFICANT CHANGE UP (ref 0.5–1.3)
CULTURE RESULTS: SIGNIFICANT CHANGE UP
GLUCOSE SERPL-MCNC: 139 MG/DL — HIGH (ref 70–99)
HBA1C BLD-MCNC: 5.6 % — SIGNIFICANT CHANGE UP (ref 4–5.6)
HDLC SERPL-MCNC: 42 MG/DL — SIGNIFICANT CHANGE UP (ref 40–125)
INR BLD: 2.25 RATIO — HIGH (ref 0.88–1.16)
LIPID PNL WITH DIRECT LDL SERPL: 38 MG/DL — SIGNIFICANT CHANGE UP
POTASSIUM SERPL-MCNC: 4.1 MMOL/L — SIGNIFICANT CHANGE UP (ref 3.5–5.3)
POTASSIUM SERPL-SCNC: 4.1 MMOL/L — SIGNIFICANT CHANGE UP (ref 3.5–5.3)
PROTHROM AB SERPL-ACNC: 24.7 SEC — HIGH (ref 9.8–12.7)
SODIUM SERPL-SCNC: 135 MMOL/L — SIGNIFICANT CHANGE UP (ref 135–145)
SPECIMEN SOURCE: SIGNIFICANT CHANGE UP
TOTAL CHOLESTEROL/HDL RATIO MEASUREMENT: 2.2 RATIO — LOW (ref 3.4–9.6)
TRIGL SERPL-MCNC: 72 MG/DL — SIGNIFICANT CHANGE UP (ref 10–149)

## 2017-04-02 PROCEDURE — 99232 SBSQ HOSP IP/OBS MODERATE 35: CPT

## 2017-04-02 PROCEDURE — 99233 SBSQ HOSP IP/OBS HIGH 50: CPT

## 2017-04-02 RX ORDER — METOPROLOL TARTRATE 50 MG
50 TABLET ORAL DAILY
Qty: 0 | Refills: 0 | Status: DISCONTINUED | OUTPATIENT
Start: 2017-04-02 | End: 2017-04-07

## 2017-04-02 RX ADMIN — Medication 2 MILLIGRAM(S): at 21:23

## 2017-04-02 RX ADMIN — SIMVASTATIN 40 MILLIGRAM(S): 20 TABLET, FILM COATED ORAL at 21:23

## 2017-04-02 RX ADMIN — FAMOTIDINE 20 MILLIGRAM(S): 10 INJECTION INTRAVENOUS at 13:08

## 2017-04-02 RX ADMIN — PIPERACILLIN AND TAZOBACTAM 25 GRAM(S): 4; .5 INJECTION, POWDER, LYOPHILIZED, FOR SOLUTION INTRAVENOUS at 06:07

## 2017-04-02 RX ADMIN — CARBIDOPA AND LEVODOPA 0.5 TABLET(S): 25; 100 TABLET ORAL at 17:22

## 2017-04-02 RX ADMIN — PIPERACILLIN AND TAZOBACTAM 25 GRAM(S): 4; .5 INJECTION, POWDER, LYOPHILIZED, FOR SOLUTION INTRAVENOUS at 13:09

## 2017-04-02 RX ADMIN — Medication 50 MILLIGRAM(S): at 21:23

## 2017-04-02 RX ADMIN — Medication 50 MILLIGRAM(S): at 06:12

## 2017-04-02 RX ADMIN — Medication 50 MILLIGRAM(S): at 13:08

## 2017-04-02 RX ADMIN — PIPERACILLIN AND TAZOBACTAM 25 GRAM(S): 4; .5 INJECTION, POWDER, LYOPHILIZED, FOR SOLUTION INTRAVENOUS at 22:10

## 2017-04-02 RX ADMIN — Medication 81 MILLIGRAM(S): at 13:08

## 2017-04-02 RX ADMIN — LOSARTAN POTASSIUM 100 MILLIGRAM(S): 100 TABLET, FILM COATED ORAL at 06:07

## 2017-04-02 RX ADMIN — BUDESONIDE AND FORMOTEROL FUMARATE DIHYDRATE 2 PUFF(S): 160; 4.5 AEROSOL RESPIRATORY (INHALATION) at 17:22

## 2017-04-02 RX ADMIN — CARBIDOPA AND LEVODOPA 0.5 TABLET(S): 25; 100 TABLET ORAL at 06:07

## 2017-04-02 RX ADMIN — BUDESONIDE AND FORMOTEROL FUMARATE DIHYDRATE 2 PUFF(S): 160; 4.5 AEROSOL RESPIRATORY (INHALATION) at 06:07

## 2017-04-03 LAB
ANION GAP SERPL CALC-SCNC: 15 MMOL/L — SIGNIFICANT CHANGE UP (ref 5–17)
BUN SERPL-MCNC: 32 MG/DL — HIGH (ref 7–23)
CALCIUM SERPL-MCNC: 8.5 MG/DL — SIGNIFICANT CHANGE UP (ref 8.4–10.5)
CHLORIDE SERPL-SCNC: 98 MMOL/L — SIGNIFICANT CHANGE UP (ref 96–108)
CO2 SERPL-SCNC: 20 MMOL/L — LOW (ref 22–31)
CREAT SERPL-MCNC: 1.08 MG/DL — SIGNIFICANT CHANGE UP (ref 0.5–1.3)
GLUCOSE SERPL-MCNC: 117 MG/DL — HIGH (ref 70–99)
HCT VFR BLD CALC: 39.3 % — SIGNIFICANT CHANGE UP (ref 39–50)
HGB BLD-MCNC: 13.6 G/DL — SIGNIFICANT CHANGE UP (ref 13–17)
INR BLD: 1.84 RATIO — HIGH (ref 0.88–1.16)
MCHC RBC-ENTMCNC: 33.6 PG — SIGNIFICANT CHANGE UP (ref 27–34)
MCHC RBC-ENTMCNC: 34.5 GM/DL — SIGNIFICANT CHANGE UP (ref 32–36)
MCV RBC AUTO: 97.2 FL — SIGNIFICANT CHANGE UP (ref 80–100)
PLATELET # BLD AUTO: 134 K/UL — LOW (ref 150–400)
POTASSIUM SERPL-MCNC: 3.9 MMOL/L — SIGNIFICANT CHANGE UP (ref 3.5–5.3)
POTASSIUM SERPL-SCNC: 3.9 MMOL/L — SIGNIFICANT CHANGE UP (ref 3.5–5.3)
PROTHROM AB SERPL-ACNC: 20.3 SEC — HIGH (ref 9.8–12.7)
RBC # BLD: 4.04 M/UL — LOW (ref 4.2–5.8)
RBC # FLD: 12.9 % — SIGNIFICANT CHANGE UP (ref 10.3–14.5)
SODIUM SERPL-SCNC: 133 MMOL/L — LOW (ref 135–145)
WBC # BLD: 11 K/UL — HIGH (ref 3.8–10.5)
WBC # FLD AUTO: 11 K/UL — HIGH (ref 3.8–10.5)

## 2017-04-03 PROCEDURE — 99233 SBSQ HOSP IP/OBS HIGH 50: CPT

## 2017-04-03 PROCEDURE — 31575 DIAGNOSTIC LARYNGOSCOPY: CPT

## 2017-04-03 PROCEDURE — 93880 EXTRACRANIAL BILAT STUDY: CPT | Mod: 26

## 2017-04-03 PROCEDURE — 74230 X-RAY XM SWLNG FUNCJ C+: CPT | Mod: 26

## 2017-04-03 PROCEDURE — 99222 1ST HOSP IP/OBS MODERATE 55: CPT | Mod: 25

## 2017-04-03 RX ORDER — ENOXAPARIN SODIUM 100 MG/ML
120 INJECTION SUBCUTANEOUS EVERY 24 HOURS
Qty: 0 | Refills: 0 | Status: DISCONTINUED | OUTPATIENT
Start: 2017-04-03 | End: 2017-04-04

## 2017-04-03 RX ORDER — HYDRALAZINE HCL 50 MG
100 TABLET ORAL THREE TIMES A DAY
Qty: 0 | Refills: 0 | Status: DISCONTINUED | OUTPATIENT
Start: 2017-04-03 | End: 2017-04-07

## 2017-04-03 RX ORDER — WARFARIN SODIUM 2.5 MG/1
4 TABLET ORAL ONCE
Qty: 0 | Refills: 0 | Status: COMPLETED | OUTPATIENT
Start: 2017-04-03 | End: 2017-04-03

## 2017-04-03 RX ADMIN — ENOXAPARIN SODIUM 120 MILLIGRAM(S): 100 INJECTION SUBCUTANEOUS at 16:38

## 2017-04-03 RX ADMIN — PIPERACILLIN AND TAZOBACTAM 25 GRAM(S): 4; .5 INJECTION, POWDER, LYOPHILIZED, FOR SOLUTION INTRAVENOUS at 21:43

## 2017-04-03 RX ADMIN — FAMOTIDINE 20 MILLIGRAM(S): 10 INJECTION INTRAVENOUS at 13:00

## 2017-04-03 RX ADMIN — Medication 81 MILLIGRAM(S): at 13:00

## 2017-04-03 RX ADMIN — BUDESONIDE AND FORMOTEROL FUMARATE DIHYDRATE 2 PUFF(S): 160; 4.5 AEROSOL RESPIRATORY (INHALATION) at 06:17

## 2017-04-03 RX ADMIN — CARBIDOPA AND LEVODOPA 0.5 TABLET(S): 25; 100 TABLET ORAL at 17:27

## 2017-04-03 RX ADMIN — SIMVASTATIN 40 MILLIGRAM(S): 20 TABLET, FILM COATED ORAL at 21:43

## 2017-04-03 RX ADMIN — WARFARIN SODIUM 4 MILLIGRAM(S): 2.5 TABLET ORAL at 21:43

## 2017-04-03 RX ADMIN — PIPERACILLIN AND TAZOBACTAM 25 GRAM(S): 4; .5 INJECTION, POWDER, LYOPHILIZED, FOR SOLUTION INTRAVENOUS at 06:18

## 2017-04-03 RX ADMIN — BUDESONIDE AND FORMOTEROL FUMARATE DIHYDRATE 2 PUFF(S): 160; 4.5 AEROSOL RESPIRATORY (INHALATION) at 17:27

## 2017-04-03 RX ADMIN — Medication 50 MILLIGRAM(S): at 21:43

## 2017-04-03 RX ADMIN — CARBIDOPA AND LEVODOPA 0.5 TABLET(S): 25; 100 TABLET ORAL at 06:17

## 2017-04-03 RX ADMIN — Medication 2 MILLIGRAM(S): at 21:43

## 2017-04-03 RX ADMIN — LOSARTAN POTASSIUM 100 MILLIGRAM(S): 100 TABLET, FILM COATED ORAL at 06:17

## 2017-04-03 RX ADMIN — Medication 50 MILLIGRAM(S): at 03:11

## 2017-04-03 RX ADMIN — Medication 100 MILLIGRAM(S): at 13:00

## 2017-04-03 RX ADMIN — PIPERACILLIN AND TAZOBACTAM 25 GRAM(S): 4; .5 INJECTION, POWDER, LYOPHILIZED, FOR SOLUTION INTRAVENOUS at 13:00

## 2017-04-03 RX ADMIN — Medication 100 MILLIGRAM(S): at 21:43

## 2017-04-03 NOTE — SWALLOW VFSS/MBS ASSESSMENT ADULT - LARYNGEAL PENETRATION DURING THE SWALLOW - SILENT
Trace Trace/shallow/Mild Trace/Mild initially shallow, descended lower as trials continued/Mild Mild/did not appear to be completely retrieved/Trace Mild/Trace Trace/shallow on initial trials however on last trial following thick puree a tspn amount of nectar thickened liquid resulted in mild laryngeal penetration noted to the level of the vocal folds with trace aspiration./Mild

## 2017-04-03 NOTE — SWALLOW VFSS/MBS ASSESSMENT ADULT - UNSUCCESSFUL STRATEGIES TRIALED DURING PROCEDURE
head turn to the right/chin tuck/productive volitional cough following clinician cue head turn to the right

## 2017-04-03 NOTE — SWALLOW VFSS/MBS ASSESSMENT ADULT - COMMENTS
Hx cont: Per Assessment Presentation of pt w/ intermittent occipital H/A since this past Sunday (no reported prior trauma until 2 days prior) in the setting of pt with PAF on Coumadin, CABG, PPM, essential HTN on multiple medications for BP control. Pt had already received his Coumadin dose from last night by spouse--spouse aware and agrees to risks/benefits of continued anticoagulation in the setting of PAF- formal neuro eval requested. MRI is precluded due to pt's PPM. Would consider EP assessment of PPM. Pt with no Rx that should explain the confusional state nor presently have evidence of occult infection as such>>will follow orthostatics in the setting of BP medications. Will assume aspiration risk. PT/ fall and bleeding precautions. Enhanced supervision. Will obtain an echo to assess aortic valves and LV function. Pt's multiple comorbidities in pt's presentation with guarded prognosis. Dx: Frequent falls, Headache, PAF. HC: 3/30 CXR: Clear lungs. HEAD CT: No acute territorial infarct, hemorrhage, mass or mass effect. C-SPINE CT: Degenerative changes without evidence of a fracture. 3/31 Seen by Cards, rx neuro eval to see if there is a reversible cause to frequent falls. C/w coumadin, ASA, antiHTN meds. Check echo. Seen by Neuro, possible Parkinson's disease given asymmetric tremor. Rx repeat CT Head, consider trial of sinemet. 4/1 Per provider contact note Elevated Rectal Temp, Mews 7. NP ordered tylenol suppository for fever, RN adminstered. NP ordered RN to reasses Rectal temp in an hour NP ordered blood cultures, lactate, UA and urine culture. Will  continue to monitor to monitor. Per cards, PPM interrogation appreciated SVT episode 12/2016, otherwise (-). CE (-) thus far, CK elevated 2/2 fall but trop (-). Rx check TTE, monitor electrolytes.

## 2017-04-03 NOTE — SWALLOW VFSS/MBS ASSESSMENT ADULT - SLP PERTINENT HISTORY OF CURRENT PROBLEM
87 yo M previously a resident of [place] but has resided in [place] for the past 10 yrs w/ h/o remote CABG in [place], PAF on Coumadin, past PCI, PPM, reported COPD (no prior primary or secondary tobacco exposure) but from a childhood complication of empyema, with spouse noting that pt has been c/o an occipital H/A since this Sunday, w/ the pt and spouse arriving in [place] this Tuesday for a local wedding. Pt reports that he fell 2 days ago and yesterday a total of 4 times--per spouse the 3rd time pt fell on the back of his head, and w/ falls to his R leg. Pt denies LOC. NO N/V. No HA at present, no focal weakness. Spouse notes that the last episode yesterday pt had fallen in [place] and pt had accidentally blocked himself against the door but was able to free himself from the door. Denies weight loss or anorexia. On exam A&O to person/place/year. Poor short term memory recall. Interactive with examiner with no need for prompting but limited cognitive assessment.

## 2017-04-03 NOTE — SWALLOW VFSS/MBS ASSESSMENT ADULT - ASPIRATION AFTER SWALLOW - SILENT
Mild/Moderate Mild/as evidenced by visible material at the level of the vocal folds following a thick puree trial, upon reinitiation of flouro./Trace

## 2017-04-03 NOTE — SWALLOW VFSS/MBS ASSESSMENT ADULT - ORAL PHASE COMMENTS
residue of oropharynx spills to the pyriform sinuses post swallow resulting in aspiration over the laryngeal surface of the arytenoids

## 2017-04-03 NOTE — SWALLOW VFSS/MBS ASSESSMENT ADULT - DIAGNOSTIC IMPRESSIONS
Pt presents with oropharyngeal dysphagia with suspected esophageal component.  Significant pharyngeal residue is not effectively cleared with purees or honey thickened liquids and occasionally results in laryngeal penetration post swallow.   Intermittent laryngeal penetration was evident with nectar thickened liquids, honey thickened liquds, thin liquids and thin purees.  Aspiration was evident with thin liquids.  Disorders: reduced lingual strength/ROM/Rate of motion, reduced BOT to posterior pharyngeal wall contact, delay in trigger of the swallow reflex, reduced hyo-laryngeal excursion, reduced laryngeal closure, reduced pharyngeal contractility, and reduced supraglottic sensation. Pt presents with oropharyngeal dysphagia with suspected esophageal component.  Significant pharyngeal residue is not effectively cleared with purees or honey thickened liquids and occasionally results in laryngeal penetration post swallow.   Intermittent laryngeal penetration was evident with nectar thickened liquids, honey thickened liquds, thin liquids and thin purees.  Aspiration was evident with thin liquids.  Disorders: reduced lingual strength/ROM/Rate of motion, reduced BOT to posterior pharyngeal wall contact, delay in trigger of the swallow reflex, reduced hyo-laryngeal excursion, reduced laryngeal closure, reduced pharyngeal contractility, and reduced supraglottic sensation.  Of note, hypertrophy of the posterior pharyngeal wall is suspected.  In addition, retrograde flow of the bolus into the pyriform sinuses suspected with periods of eructation during exam. Pt presents with oropharyngeal dysphagia with suspected esophageal component.  Significant pharyngeal residue is not effectively cleared with postures/strategies and occasionally results in laryngeal penetration or aspiration primarily post swallow.   Intermittent laryngeal penetration was evident with purees, nectar thickened liquids, honey thickened liquids, thin liquids and thin purees.  Aspiration was inconsistently evident with thin liquids and thick purees.  Disorders include: reduced lingual strength/ROM/Rate of motion, reduced BOT to posterior pharyngeal wall contact, delay in trigger of the swallow reflex, reduced hyo-laryngeal excursion, reduced epiglottic retroflexion, reduced laryngeal closure, reduced pharyngeal contractility, and reduced supraglottic sensation.  Of note, hypertrophy of the posterior pharyngeal wall is suspected.  In addition, retrograde flow of the bolus into the pyriform sinuses suspected with periods of eructation during exam.

## 2017-04-03 NOTE — SWALLOW VFSS/MBS ASSESSMENT ADULT - ROSENBEK'S PENETRATION ASPIRATION SCALE
(3) contrast remains above the vocal cords, visible residue remains (penetration) (6) contrast passes glottis, no subglottic residue remains (aspiration) (8) contrast passes glottis, visible subglottic residue remains, absent patient response (aspiration)

## 2017-04-03 NOTE — SWALLOW VFSS/MBS ASSESSMENT ADULT - ORAL PHASE
Residue in oral cavity Laryngeal penetration before swallow - silent Reduced anterior - posterior transport/Residue in oral cavity/Laryngeal penetration before swallow - silent Reduced anterior - posterior transport/Residue in oral cavity/Incomplete tongue to palate contact/Uncontrolled bolus / spillover in krystyna-pharynx Incomplete tongue to palate contact/Reduced anterior - posterior transport/Uncontrolled bolus / spillover in krystyna-pharynx/Residue in oral cavity Uncontrolled bolus / spillover in krystyna-pharynx/Incomplete tongue to palate contact/Laryngeal penetration before swallow - silent/suspected piecemeal swallows vs oral residue/Residue in oral cavity Reduced anterior - posterior transport/Residue in oral cavity/spillover to the laryngeal surface of the epiglottis;/Laryngeal penetration before swallow - silent

## 2017-04-03 NOTE — SWALLOW VFSS/MBS ASSESSMENT ADULT - ADDITIONAL INFORMATION
Pt shoulder partially obscures view of the laryngeal vestibule, despite repositioning.  Suspected hypertrophy of the posterior pharyngeal wall; suspected retrograde flow of the bolus into the pyriform sinuses with periods of eructation during exam.

## 2017-04-04 LAB
ANION GAP SERPL CALC-SCNC: 8 MMOL/L — SIGNIFICANT CHANGE UP (ref 5–17)
APTT BLD: 35.3 SEC — SIGNIFICANT CHANGE UP (ref 27.5–37.4)
BLD GP AB SCN SERPL QL: NEGATIVE — SIGNIFICANT CHANGE UP
BUN SERPL-MCNC: 32 MG/DL — HIGH (ref 7–23)
CALCIUM SERPL-MCNC: 8.4 MG/DL — SIGNIFICANT CHANGE UP (ref 8.4–10.5)
CHLORIDE SERPL-SCNC: 96 MMOL/L — SIGNIFICANT CHANGE UP (ref 96–108)
CO2 SERPL-SCNC: 32 MMOL/L — HIGH (ref 22–31)
CREAT SERPL-MCNC: 1.14 MG/DL — SIGNIFICANT CHANGE UP (ref 0.5–1.3)
GLUCOSE SERPL-MCNC: 125 MG/DL — HIGH (ref 70–99)
HCT VFR BLD CALC: 37.5 % — LOW (ref 39–50)
HCT VFR BLD CALC: 38 % — LOW (ref 39–50)
HGB BLD-MCNC: 12.9 G/DL — LOW (ref 13–17)
HGB BLD-MCNC: 13.2 G/DL — SIGNIFICANT CHANGE UP (ref 13–17)
INR BLD: 1.62 RATIO — HIGH (ref 0.88–1.16)
INR BLD: 1.71 RATIO — HIGH (ref 0.88–1.16)
MCHC RBC-ENTMCNC: 33.4 PG — SIGNIFICANT CHANGE UP (ref 27–34)
MCHC RBC-ENTMCNC: 33.8 PG — SIGNIFICANT CHANGE UP (ref 27–34)
MCHC RBC-ENTMCNC: 34.5 GM/DL — SIGNIFICANT CHANGE UP (ref 32–36)
MCHC RBC-ENTMCNC: 34.8 GM/DL — SIGNIFICANT CHANGE UP (ref 32–36)
MCV RBC AUTO: 96.9 FL — SIGNIFICANT CHANGE UP (ref 80–100)
MCV RBC AUTO: 97.1 FL — SIGNIFICANT CHANGE UP (ref 80–100)
PLATELET # BLD AUTO: 136 K/UL — LOW (ref 150–400)
PLATELET # BLD AUTO: 151 K/UL — SIGNIFICANT CHANGE UP (ref 150–400)
POTASSIUM SERPL-MCNC: 3.5 MMOL/L — SIGNIFICANT CHANGE UP (ref 3.5–5.3)
POTASSIUM SERPL-SCNC: 3.5 MMOL/L — SIGNIFICANT CHANGE UP (ref 3.5–5.3)
PROTHROM AB SERPL-ACNC: 17.7 SEC — HIGH (ref 9.8–12.7)
PROTHROM AB SERPL-ACNC: 18.8 SEC — HIGH (ref 9.8–12.7)
RBC # BLD: 3.87 M/UL — LOW (ref 4.2–5.8)
RBC # BLD: 3.91 M/UL — LOW (ref 4.2–5.8)
RBC # FLD: 12.9 % — SIGNIFICANT CHANGE UP (ref 10.3–14.5)
RBC # FLD: 13 % — SIGNIFICANT CHANGE UP (ref 10.3–14.5)
RH IG SCN BLD-IMP: POSITIVE — SIGNIFICANT CHANGE UP
RH IG SCN BLD-IMP: POSITIVE — SIGNIFICANT CHANGE UP
SODIUM SERPL-SCNC: 136 MMOL/L — SIGNIFICANT CHANGE UP (ref 135–145)
WBC # BLD: 10 K/UL — SIGNIFICANT CHANGE UP (ref 3.8–10.5)
WBC # BLD: 12.1 K/UL — HIGH (ref 3.8–10.5)
WBC # FLD AUTO: 10 K/UL — SIGNIFICANT CHANGE UP (ref 3.8–10.5)
WBC # FLD AUTO: 12.1 K/UL — HIGH (ref 3.8–10.5)

## 2017-04-04 PROCEDURE — 99233 SBSQ HOSP IP/OBS HIGH 50: CPT

## 2017-04-04 PROCEDURE — 99232 SBSQ HOSP IP/OBS MODERATE 35: CPT

## 2017-04-04 PROCEDURE — 70450 CT HEAD/BRAIN W/O DYE: CPT | Mod: 26

## 2017-04-04 RX ORDER — AMLODIPINE BESYLATE 2.5 MG/1
5 TABLET ORAL DAILY
Qty: 0 | Refills: 0 | Status: DISCONTINUED | OUTPATIENT
Start: 2017-04-04 | End: 2017-04-07

## 2017-04-04 RX ORDER — PHYTONADIONE (VIT K1) 5 MG
5 TABLET ORAL DAILY
Qty: 0 | Refills: 0 | Status: COMPLETED | OUTPATIENT
Start: 2017-04-04 | End: 2017-04-06

## 2017-04-04 RX ORDER — WARFARIN SODIUM 2.5 MG/1
5 TABLET ORAL ONCE
Qty: 0 | Refills: 0 | Status: DISCONTINUED | OUTPATIENT
Start: 2017-04-04 | End: 2017-04-04

## 2017-04-04 RX ADMIN — CARBIDOPA AND LEVODOPA 0.5 TABLET(S): 25; 100 TABLET ORAL at 17:09

## 2017-04-04 RX ADMIN — SIMVASTATIN 40 MILLIGRAM(S): 20 TABLET, FILM COATED ORAL at 22:07

## 2017-04-04 RX ADMIN — PIPERACILLIN AND TAZOBACTAM 25 GRAM(S): 4; .5 INJECTION, POWDER, LYOPHILIZED, FOR SOLUTION INTRAVENOUS at 05:46

## 2017-04-04 RX ADMIN — FAMOTIDINE 20 MILLIGRAM(S): 10 INJECTION INTRAVENOUS at 09:16

## 2017-04-04 RX ADMIN — Medication 2 MILLIGRAM(S): at 22:07

## 2017-04-04 RX ADMIN — Medication 5 MILLIGRAM(S): at 17:59

## 2017-04-04 RX ADMIN — Medication 100 MILLIGRAM(S): at 05:46

## 2017-04-04 RX ADMIN — BUDESONIDE AND FORMOTEROL FUMARATE DIHYDRATE 2 PUFF(S): 160; 4.5 AEROSOL RESPIRATORY (INHALATION) at 05:46

## 2017-04-04 RX ADMIN — PIPERACILLIN AND TAZOBACTAM 25 GRAM(S): 4; .5 INJECTION, POWDER, LYOPHILIZED, FOR SOLUTION INTRAVENOUS at 22:19

## 2017-04-04 RX ADMIN — BUDESONIDE AND FORMOTEROL FUMARATE DIHYDRATE 2 PUFF(S): 160; 4.5 AEROSOL RESPIRATORY (INHALATION) at 17:10

## 2017-04-04 RX ADMIN — Medication 100 MILLIGRAM(S): at 22:07

## 2017-04-04 RX ADMIN — Medication 50 MILLIGRAM(S): at 22:07

## 2017-04-04 RX ADMIN — Medication 100 MILLIGRAM(S): at 13:28

## 2017-04-04 RX ADMIN — CARBIDOPA AND LEVODOPA 0.5 TABLET(S): 25; 100 TABLET ORAL at 05:46

## 2017-04-04 RX ADMIN — LOSARTAN POTASSIUM 100 MILLIGRAM(S): 100 TABLET, FILM COATED ORAL at 05:46

## 2017-04-04 RX ADMIN — Medication 81 MILLIGRAM(S): at 09:16

## 2017-04-04 RX ADMIN — PIPERACILLIN AND TAZOBACTAM 25 GRAM(S): 4; .5 INJECTION, POWDER, LYOPHILIZED, FOR SOLUTION INTRAVENOUS at 13:29

## 2017-04-04 RX ADMIN — AMLODIPINE BESYLATE 5 MILLIGRAM(S): 2.5 TABLET ORAL at 09:16

## 2017-04-05 LAB
ANION GAP SERPL CALC-SCNC: 13 MMOL/L — SIGNIFICANT CHANGE UP (ref 5–17)
BUN SERPL-MCNC: 28 MG/DL — HIGH (ref 7–23)
CALCIUM SERPL-MCNC: 8.6 MG/DL — SIGNIFICANT CHANGE UP (ref 8.4–10.5)
CHLORIDE SERPL-SCNC: 96 MMOL/L — SIGNIFICANT CHANGE UP (ref 96–108)
CO2 SERPL-SCNC: 24 MMOL/L — SIGNIFICANT CHANGE UP (ref 22–31)
CREAT SERPL-MCNC: 1.12 MG/DL — SIGNIFICANT CHANGE UP (ref 0.5–1.3)
GLUCOSE SERPL-MCNC: 109 MG/DL — HIGH (ref 70–99)
HCT VFR BLD CALC: 38.8 % — LOW (ref 39–50)
HGB BLD-MCNC: 13.1 G/DL — SIGNIFICANT CHANGE UP (ref 13–17)
INR BLD: 1.54 RATIO — HIGH (ref 0.88–1.16)
MCHC RBC-ENTMCNC: 32.9 PG — SIGNIFICANT CHANGE UP (ref 27–34)
MCHC RBC-ENTMCNC: 33.8 GM/DL — SIGNIFICANT CHANGE UP (ref 32–36)
MCV RBC AUTO: 97.4 FL — SIGNIFICANT CHANGE UP (ref 80–100)
PLATELET # BLD AUTO: 211 K/UL — SIGNIFICANT CHANGE UP (ref 150–400)
POTASSIUM SERPL-MCNC: 3.8 MMOL/L — SIGNIFICANT CHANGE UP (ref 3.5–5.3)
POTASSIUM SERPL-SCNC: 3.8 MMOL/L — SIGNIFICANT CHANGE UP (ref 3.5–5.3)
PROTHROM AB SERPL-ACNC: 16.9 SEC — HIGH (ref 9.8–12.7)
RBC # BLD: 3.98 M/UL — LOW (ref 4.2–5.8)
RBC # FLD: 12.9 % — SIGNIFICANT CHANGE UP (ref 10.3–14.5)
SODIUM SERPL-SCNC: 133 MMOL/L — LOW (ref 135–145)
WBC # BLD: 11.1 K/UL — HIGH (ref 3.8–10.5)
WBC # FLD AUTO: 11.1 K/UL — HIGH (ref 3.8–10.5)

## 2017-04-05 PROCEDURE — 99233 SBSQ HOSP IP/OBS HIGH 50: CPT

## 2017-04-05 PROCEDURE — 93306 TTE W/DOPPLER COMPLETE: CPT | Mod: 26

## 2017-04-05 PROCEDURE — 70450 CT HEAD/BRAIN W/O DYE: CPT | Mod: 26

## 2017-04-05 RX ADMIN — Medication 100 MILLIGRAM(S): at 21:37

## 2017-04-05 RX ADMIN — SIMVASTATIN 40 MILLIGRAM(S): 20 TABLET, FILM COATED ORAL at 21:37

## 2017-04-05 RX ADMIN — FAMOTIDINE 20 MILLIGRAM(S): 10 INJECTION INTRAVENOUS at 10:28

## 2017-04-05 RX ADMIN — BUDESONIDE AND FORMOTEROL FUMARATE DIHYDRATE 2 PUFF(S): 160; 4.5 AEROSOL RESPIRATORY (INHALATION) at 16:46

## 2017-04-05 RX ADMIN — PIPERACILLIN AND TAZOBACTAM 25 GRAM(S): 4; .5 INJECTION, POWDER, LYOPHILIZED, FOR SOLUTION INTRAVENOUS at 05:56

## 2017-04-05 RX ADMIN — BUDESONIDE AND FORMOTEROL FUMARATE DIHYDRATE 2 PUFF(S): 160; 4.5 AEROSOL RESPIRATORY (INHALATION) at 05:57

## 2017-04-05 RX ADMIN — Medication 5 MILLIGRAM(S): at 13:19

## 2017-04-05 RX ADMIN — LOSARTAN POTASSIUM 100 MILLIGRAM(S): 100 TABLET, FILM COATED ORAL at 05:57

## 2017-04-05 RX ADMIN — PIPERACILLIN AND TAZOBACTAM 25 GRAM(S): 4; .5 INJECTION, POWDER, LYOPHILIZED, FOR SOLUTION INTRAVENOUS at 13:19

## 2017-04-05 RX ADMIN — Medication 2 MILLIGRAM(S): at 21:37

## 2017-04-05 RX ADMIN — CARBIDOPA AND LEVODOPA 0.5 TABLET(S): 25; 100 TABLET ORAL at 05:56

## 2017-04-05 RX ADMIN — Medication 50 MILLIGRAM(S): at 21:37

## 2017-04-05 RX ADMIN — PIPERACILLIN AND TAZOBACTAM 25 GRAM(S): 4; .5 INJECTION, POWDER, LYOPHILIZED, FOR SOLUTION INTRAVENOUS at 21:47

## 2017-04-05 RX ADMIN — Medication 100 MILLIGRAM(S): at 13:21

## 2017-04-05 RX ADMIN — Medication 100 MILLIGRAM(S): at 05:57

## 2017-04-05 RX ADMIN — AMLODIPINE BESYLATE 5 MILLIGRAM(S): 2.5 TABLET ORAL at 05:57

## 2017-04-05 NOTE — PROVIDER CONTACT NOTE (OTHER) - SITUATION
Pt had a rectal temp of 102.2 on admission vitals. Pt warm and disoriented. MEWS system activated
Patient hypertensive; manual BP: 180/82
Patient hypertensive; manual BP: 182/82; HR currently 60s on cardiac monitor
unable to perform orthostatic vitals, pt. refusing to sit and stand

## 2017-04-05 NOTE — PROVIDER CONTACT NOTE (OTHER) - ASSESSMENT
Pt A+O x2, pt confused. /89, 94 O2 sat, 102.2 rectal temp, 64 pulse.
BP lying = 163/83, HR = 66
patient asymptomatic; manual BP: 180/82; HR currently 60s
patient asymptomatic; manual BP: 182/82; HR 60s on cardiac monitor

## 2017-04-05 NOTE — PROVIDER CONTACT NOTE (OTHER) - REASON
Elevated Rectal Temp, Mews 7
Patient hypertensive
unable to perform orthostatic vitals, pt. refusing to sit and stand
Patient hypertensive

## 2017-04-05 NOTE — PROVIDER CONTACT NOTE (OTHER) - BACKGROUND
Pt admitted for s/p multiple falls. PMH of of COPD.
admitted with aspiration PNA
patient admitted s/p fall; hx HTN
patient admitted s/p fall; hx HTN; vitals Q4hr

## 2017-04-06 LAB
ANION GAP SERPL CALC-SCNC: 15 MMOL/L — SIGNIFICANT CHANGE UP (ref 5–17)
BUN SERPL-MCNC: 24 MG/DL — HIGH (ref 7–23)
CALCIUM SERPL-MCNC: 8.5 MG/DL — SIGNIFICANT CHANGE UP (ref 8.4–10.5)
CHLORIDE SERPL-SCNC: 93 MMOL/L — LOW (ref 96–108)
CO2 SERPL-SCNC: 23 MMOL/L — SIGNIFICANT CHANGE UP (ref 22–31)
CREAT SERPL-MCNC: 0.99 MG/DL — SIGNIFICANT CHANGE UP (ref 0.5–1.3)
CULTURE RESULTS: SIGNIFICANT CHANGE UP
CULTURE RESULTS: SIGNIFICANT CHANGE UP
GLUCOSE SERPL-MCNC: 125 MG/DL — HIGH (ref 70–99)
HCT VFR BLD CALC: 37.9 % — LOW (ref 39–50)
HGB BLD-MCNC: 12.8 G/DL — LOW (ref 13–17)
INR BLD: 1.39 RATIO — HIGH (ref 0.88–1.16)
MCHC RBC-ENTMCNC: 33.1 PG — SIGNIFICANT CHANGE UP (ref 27–34)
MCHC RBC-ENTMCNC: 33.9 GM/DL — SIGNIFICANT CHANGE UP (ref 32–36)
MCV RBC AUTO: 97.7 FL — SIGNIFICANT CHANGE UP (ref 80–100)
PLATELET # BLD AUTO: 181 K/UL — SIGNIFICANT CHANGE UP (ref 150–400)
POTASSIUM SERPL-MCNC: 3.8 MMOL/L — SIGNIFICANT CHANGE UP (ref 3.5–5.3)
POTASSIUM SERPL-SCNC: 3.8 MMOL/L — SIGNIFICANT CHANGE UP (ref 3.5–5.3)
PROTHROM AB SERPL-ACNC: 15.1 SEC — HIGH (ref 9.8–12.7)
RBC # BLD: 3.88 M/UL — LOW (ref 4.2–5.8)
RBC # FLD: 13.3 % — SIGNIFICANT CHANGE UP (ref 10.3–14.5)
SODIUM SERPL-SCNC: 131 MMOL/L — LOW (ref 135–145)
SPECIMEN SOURCE: SIGNIFICANT CHANGE UP
SPECIMEN SOURCE: SIGNIFICANT CHANGE UP
WBC # BLD: 9.6 K/UL — SIGNIFICANT CHANGE UP (ref 3.8–10.5)
WBC # FLD AUTO: 9.6 K/UL — SIGNIFICANT CHANGE UP (ref 3.8–10.5)

## 2017-04-06 PROCEDURE — 99233 SBSQ HOSP IP/OBS HIGH 50: CPT

## 2017-04-06 RX ORDER — FUROSEMIDE 40 MG
20 TABLET ORAL ONCE
Qty: 0 | Refills: 0 | Status: COMPLETED | OUTPATIENT
Start: 2017-04-06 | End: 2017-04-06

## 2017-04-06 RX ADMIN — PIPERACILLIN AND TAZOBACTAM 25 GRAM(S): 4; .5 INJECTION, POWDER, LYOPHILIZED, FOR SOLUTION INTRAVENOUS at 05:40

## 2017-04-06 RX ADMIN — LOSARTAN POTASSIUM 100 MILLIGRAM(S): 100 TABLET, FILM COATED ORAL at 05:43

## 2017-04-06 RX ADMIN — Medication 100 MILLIGRAM(S): at 21:53

## 2017-04-06 RX ADMIN — Medication 100 MILLIGRAM(S): at 05:43

## 2017-04-06 RX ADMIN — SIMVASTATIN 40 MILLIGRAM(S): 20 TABLET, FILM COATED ORAL at 21:52

## 2017-04-06 RX ADMIN — BUDESONIDE AND FORMOTEROL FUMARATE DIHYDRATE 2 PUFF(S): 160; 4.5 AEROSOL RESPIRATORY (INHALATION) at 18:28

## 2017-04-06 RX ADMIN — Medication 50 MILLIGRAM(S): at 22:44

## 2017-04-06 RX ADMIN — AMLODIPINE BESYLATE 5 MILLIGRAM(S): 2.5 TABLET ORAL at 05:43

## 2017-04-06 RX ADMIN — Medication 100 MILLIGRAM(S): at 13:57

## 2017-04-06 RX ADMIN — Medication 2 MILLIGRAM(S): at 21:52

## 2017-04-06 RX ADMIN — BUDESONIDE AND FORMOTEROL FUMARATE DIHYDRATE 2 PUFF(S): 160; 4.5 AEROSOL RESPIRATORY (INHALATION) at 05:40

## 2017-04-06 RX ADMIN — Medication 20 MILLIGRAM(S): at 10:23

## 2017-04-06 RX ADMIN — FAMOTIDINE 20 MILLIGRAM(S): 10 INJECTION INTRAVENOUS at 12:23

## 2017-04-07 ENCOUNTER — TRANSCRIPTION ENCOUNTER (OUTPATIENT)
Age: 82
End: 2017-04-07

## 2017-04-07 VITALS
DIASTOLIC BLOOD PRESSURE: 62 MMHG | HEART RATE: 62 BPM | OXYGEN SATURATION: 94 % | RESPIRATION RATE: 18 BRPM | SYSTOLIC BLOOD PRESSURE: 154 MMHG | TEMPERATURE: 98 F

## 2017-04-07 LAB
ANION GAP SERPL CALC-SCNC: 16 MMOL/L — SIGNIFICANT CHANGE UP (ref 5–17)
BUN SERPL-MCNC: 24 MG/DL — HIGH (ref 7–23)
CALCIUM SERPL-MCNC: 8.7 MG/DL — SIGNIFICANT CHANGE UP (ref 8.4–10.5)
CHLORIDE SERPL-SCNC: 93 MMOL/L — LOW (ref 96–108)
CO2 SERPL-SCNC: 24 MMOL/L — SIGNIFICANT CHANGE UP (ref 22–31)
CREAT SERPL-MCNC: 0.95 MG/DL — SIGNIFICANT CHANGE UP (ref 0.5–1.3)
GLUCOSE SERPL-MCNC: 114 MG/DL — HIGH (ref 70–99)
HCT VFR BLD CALC: 38.5 % — LOW (ref 39–50)
HGB BLD-MCNC: 13.1 G/DL — SIGNIFICANT CHANGE UP (ref 13–17)
MCHC RBC-ENTMCNC: 33 PG — SIGNIFICANT CHANGE UP (ref 27–34)
MCHC RBC-ENTMCNC: 33.9 GM/DL — SIGNIFICANT CHANGE UP (ref 32–36)
MCV RBC AUTO: 97.2 FL — SIGNIFICANT CHANGE UP (ref 80–100)
PLATELET # BLD AUTO: 185 K/UL — SIGNIFICANT CHANGE UP (ref 150–400)
POTASSIUM SERPL-MCNC: 3.7 MMOL/L — SIGNIFICANT CHANGE UP (ref 3.5–5.3)
POTASSIUM SERPL-SCNC: 3.7 MMOL/L — SIGNIFICANT CHANGE UP (ref 3.5–5.3)
RBC # BLD: 3.96 M/UL — LOW (ref 4.2–5.8)
RBC # FLD: 12.7 % — SIGNIFICANT CHANGE UP (ref 10.3–14.5)
SODIUM SERPL-SCNC: 133 MMOL/L — LOW (ref 135–145)
WBC # BLD: 9.9 K/UL — SIGNIFICANT CHANGE UP (ref 3.8–10.5)
WBC # FLD AUTO: 9.9 K/UL — SIGNIFICANT CHANGE UP (ref 3.8–10.5)

## 2017-04-07 PROCEDURE — 87486 CHLMYD PNEUM DNA AMP PROBE: CPT

## 2017-04-07 PROCEDURE — C8929: CPT

## 2017-04-07 PROCEDURE — 74230 X-RAY XM SWLNG FUNCJ C+: CPT

## 2017-04-07 PROCEDURE — 87581 M.PNEUMON DNA AMP PROBE: CPT

## 2017-04-07 PROCEDURE — 80048 BASIC METABOLIC PNL TOTAL CA: CPT

## 2017-04-07 PROCEDURE — 93880 EXTRACRANIAL BILAT STUDY: CPT

## 2017-04-07 PROCEDURE — 97530 THERAPEUTIC ACTIVITIES: CPT

## 2017-04-07 PROCEDURE — 99233 SBSQ HOSP IP/OBS HIGH 50: CPT

## 2017-04-07 PROCEDURE — 85730 THROMBOPLASTIN TIME PARTIAL: CPT

## 2017-04-07 PROCEDURE — 82435 ASSAY OF BLOOD CHLORIDE: CPT

## 2017-04-07 PROCEDURE — 96374 THER/PROPH/DIAG INJ IV PUSH: CPT

## 2017-04-07 PROCEDURE — 36430 TRANSFUSION BLD/BLD COMPNT: CPT

## 2017-04-07 PROCEDURE — 92610 EVALUATE SWALLOWING FUNCTION: CPT

## 2017-04-07 PROCEDURE — 94640 AIRWAY INHALATION TREATMENT: CPT

## 2017-04-07 PROCEDURE — 83735 ASSAY OF MAGNESIUM: CPT

## 2017-04-07 PROCEDURE — 83605 ASSAY OF LACTIC ACID: CPT

## 2017-04-07 PROCEDURE — 84295 ASSAY OF SERUM SODIUM: CPT

## 2017-04-07 PROCEDURE — 82553 CREATINE MB FRACTION: CPT

## 2017-04-07 PROCEDURE — 82803 BLOOD GASES ANY COMBINATION: CPT

## 2017-04-07 PROCEDURE — 87040 BLOOD CULTURE FOR BACTERIA: CPT

## 2017-04-07 PROCEDURE — 82330 ASSAY OF CALCIUM: CPT

## 2017-04-07 PROCEDURE — 97162 PT EVAL MOD COMPLEX 30 MIN: CPT

## 2017-04-07 PROCEDURE — P9037: CPT

## 2017-04-07 PROCEDURE — 99285 EMERGENCY DEPT VISIT HI MDM: CPT | Mod: 25

## 2017-04-07 PROCEDURE — 83036 HEMOGLOBIN GLYCOSYLATED A1C: CPT

## 2017-04-07 PROCEDURE — 87633 RESP VIRUS 12-25 TARGETS: CPT

## 2017-04-07 PROCEDURE — 84484 ASSAY OF TROPONIN QUANT: CPT

## 2017-04-07 PROCEDURE — 71250 CT THORAX DX C-: CPT

## 2017-04-07 PROCEDURE — 82550 ASSAY OF CK (CPK): CPT

## 2017-04-07 PROCEDURE — 87798 DETECT AGENT NOS DNA AMP: CPT

## 2017-04-07 PROCEDURE — 80053 COMPREHEN METABOLIC PANEL: CPT

## 2017-04-07 PROCEDURE — 87086 URINE CULTURE/COLONY COUNT: CPT

## 2017-04-07 PROCEDURE — 84132 ASSAY OF SERUM POTASSIUM: CPT

## 2017-04-07 PROCEDURE — 85027 COMPLETE CBC AUTOMATED: CPT

## 2017-04-07 PROCEDURE — 97110 THERAPEUTIC EXERCISES: CPT

## 2017-04-07 PROCEDURE — 92611 MOTION FLUOROSCOPY/SWALLOW: CPT

## 2017-04-07 PROCEDURE — 85014 HEMATOCRIT: CPT

## 2017-04-07 PROCEDURE — 82947 ASSAY GLUCOSE BLOOD QUANT: CPT

## 2017-04-07 PROCEDURE — 70450 CT HEAD/BRAIN W/O DYE: CPT

## 2017-04-07 PROCEDURE — 81001 URINALYSIS AUTO W/SCOPE: CPT

## 2017-04-07 PROCEDURE — 86900 BLOOD TYPING SEROLOGIC ABO: CPT

## 2017-04-07 PROCEDURE — 86901 BLOOD TYPING SEROLOGIC RH(D): CPT

## 2017-04-07 PROCEDURE — 71045 X-RAY EXAM CHEST 1 VIEW: CPT

## 2017-04-07 PROCEDURE — 72125 CT NECK SPINE W/O DYE: CPT

## 2017-04-07 PROCEDURE — 80061 LIPID PANEL: CPT

## 2017-04-07 PROCEDURE — 85610 PROTHROMBIN TIME: CPT

## 2017-04-07 PROCEDURE — 84100 ASSAY OF PHOSPHORUS: CPT

## 2017-04-07 PROCEDURE — 93005 ELECTROCARDIOGRAM TRACING: CPT

## 2017-04-07 PROCEDURE — 86850 RBC ANTIBODY SCREEN: CPT

## 2017-04-07 RX ORDER — HYDRALAZINE HCL 50 MG
1 TABLET ORAL
Qty: 0 | Refills: 0 | COMMUNITY

## 2017-04-07 RX ORDER — METOPROLOL TARTRATE 50 MG
1 TABLET ORAL
Qty: 0 | Refills: 0 | COMMUNITY
Start: 2017-04-07

## 2017-04-07 RX ORDER — LOSARTAN POTASSIUM 100 MG/1
1 TABLET, FILM COATED ORAL
Qty: 0 | Refills: 0 | COMMUNITY

## 2017-04-07 RX ORDER — SIMVASTATIN 20 MG/1
1 TABLET, FILM COATED ORAL
Qty: 0 | Refills: 0 | COMMUNITY

## 2017-04-07 RX ORDER — RANITIDINE HYDROCHLORIDE 150 MG/1
1 TABLET, FILM COATED ORAL
Qty: 0 | Refills: 0 | COMMUNITY

## 2017-04-07 RX ORDER — WARFARIN SODIUM 2.5 MG/1
1 TABLET ORAL
Qty: 0 | Refills: 0 | COMMUNITY

## 2017-04-07 RX ORDER — TERAZOSIN HYDROCHLORIDE 10 MG/1
1 CAPSULE ORAL
Qty: 0 | Refills: 0 | COMMUNITY

## 2017-04-07 RX ORDER — LOSARTAN POTASSIUM 100 MG/1
1 TABLET, FILM COATED ORAL
Qty: 0 | Refills: 0 | COMMUNITY
Start: 2017-04-07

## 2017-04-07 RX ORDER — ACETAMINOPHEN 500 MG
2 TABLET ORAL
Qty: 0 | Refills: 0 | COMMUNITY
Start: 2017-04-07

## 2017-04-07 RX ORDER — HYDRALAZINE HCL 50 MG
1 TABLET ORAL
Qty: 0 | Refills: 0 | COMMUNITY
Start: 2017-04-07

## 2017-04-07 RX ORDER — SIMVASTATIN 20 MG/1
1 TABLET, FILM COATED ORAL
Qty: 0 | Refills: 0 | COMMUNITY
Start: 2017-04-07

## 2017-04-07 RX ORDER — DOXAZOSIN MESYLATE 4 MG
1 TABLET ORAL
Qty: 0 | Refills: 0 | COMMUNITY
Start: 2017-04-07

## 2017-04-07 RX ORDER — FAMOTIDINE 10 MG/ML
1 INJECTION INTRAVENOUS
Qty: 0 | Refills: 0 | COMMUNITY
Start: 2017-04-07

## 2017-04-07 RX ORDER — AMLODIPINE BESYLATE 2.5 MG/1
1 TABLET ORAL
Qty: 0 | Refills: 0 | COMMUNITY
Start: 2017-04-07

## 2017-04-07 RX ORDER — METOPROLOL TARTRATE 50 MG
1 TABLET ORAL
Qty: 0 | Refills: 0 | COMMUNITY

## 2017-04-07 RX ORDER — ASPIRIN/CALCIUM CARB/MAGNESIUM 324 MG
1 TABLET ORAL
Qty: 0 | Refills: 0 | COMMUNITY

## 2017-04-07 RX ADMIN — BUDESONIDE AND FORMOTEROL FUMARATE DIHYDRATE 2 PUFF(S): 160; 4.5 AEROSOL RESPIRATORY (INHALATION) at 05:15

## 2017-04-07 RX ADMIN — Medication 100 MILLIGRAM(S): at 13:20

## 2017-04-07 RX ADMIN — AMLODIPINE BESYLATE 5 MILLIGRAM(S): 2.5 TABLET ORAL at 05:15

## 2017-04-07 RX ADMIN — FAMOTIDINE 20 MILLIGRAM(S): 10 INJECTION INTRAVENOUS at 13:20

## 2017-04-07 RX ADMIN — LOSARTAN POTASSIUM 100 MILLIGRAM(S): 100 TABLET, FILM COATED ORAL at 05:14

## 2017-04-07 RX ADMIN — Medication 100 MILLIGRAM(S): at 05:15

## 2017-04-07 NOTE — DISCHARGE NOTE ADULT - SECONDARY DIAGNOSIS.
Frequent falls Acute post-traumatic headache, not intractable Essential hypertension Paroxysmal atrial fibrillation Chronic obstructive pulmonary disease, unspecified COPD type High cholesterol Aspiration pneumonia, unspecified aspiration pneumonia type, unspecified laterality, unspecified part of lung

## 2017-04-07 NOTE — DISCHARGE NOTE ADULT - CARE PLAN
Principal Discharge DX:	Subarachnoid hemorrhage  Goal:	Pt. is stable. Fall precautions.  Instructions for follow-up, activity and diet:	Follow up with neurology outpatient after rehab discharge  Secondary Diagnosis:	Frequent falls  Secondary Diagnosis:	Acute post-traumatic headache, not intractable  Secondary Diagnosis:	Essential hypertension  Secondary Diagnosis:	Paroxysmal atrial fibrillation  Secondary Diagnosis:	Chronic obstructive pulmonary disease, unspecified COPD type  Secondary Diagnosis:	High cholesterol Principal Discharge DX:	Subarachnoid hemorrhage  Goal:	Pt. is stable. Fall precautions.  Instructions for follow-up, activity and diet:	Follow up with neurology outpatient after rehab discharge. All anticoagulation is currently discontinued. Follow up with primary doctor. Repeat CT scan in one month.  Secondary Diagnosis:	Frequent falls  Goal:	Fall precautions  Secondary Diagnosis:	Acute post-traumatic headache, not intractable  Goal:	Pt. is stable.  Secondary Diagnosis:	Essential hypertension  Goal:	Continue Blood pressure medications.  Secondary Diagnosis:	Paroxysmal atrial fibrillation  Goal:	Pt. is stable.  Instructions for follow-up, activity and diet:	Hold anticoagulation secondary to subacute hemorrhage. follow up with cardiology outpt.  Secondary Diagnosis:	Chronic obstructive pulmonary disease, unspecified COPD type  Goal:	Pt. is stable  Secondary Diagnosis:	Aspiration pneumonia, unspecified aspiration pneumonia type, unspecified laterality, unspecified part of lung  Goal:	Pt. is stable. Principal Discharge DX:	Subarachnoid hemorrhage  Goal:	Pt. is stable. possible related to fall. Fall precautions.  Instructions for follow-up, activity and diet:	Follow up with neurology outpatient after rehab discharge. All anticoagulation is currently discontinued. Follow up with primary doctor. Repeat CT scan in one month.  no neuro intervention    PT in rehab  Secondary Diagnosis:	Frequent falls  Goal:	Fall precautions  Instructions for follow-up, activity and diet:	fall precaution  Secondary Diagnosis:	Acute post-traumatic headache, not intractable  Goal:	Pt. is stable.  Instructions for follow-up, activity and diet:	Please follow up with Neurology outpatient  Secondary Diagnosis:	Essential hypertension  Goal:	Continue Blood pressure medications.  Instructions for follow-up, activity and diet:	Low salt diet  Activity as tolerated.  Take all medication as prescribed.  Follow up with your medical doctor for routine blood pressure monitoring at your next visit.  Notify your doctor if you have any of the following symptoms:   Dizziness, Lightheadedness, Blurry vision, Headache, Chest pain, Shortness of breath  Secondary Diagnosis:	Paroxysmal atrial fibrillation  Goal:	Pt. is stable.  Instructions for follow-up, activity and diet:	Hold anticoagulation secondary to subacute hemorrhage. follow up with cardiology outpt.  continue with beta blocker  Secondary Diagnosis:	Chronic obstructive pulmonary disease, unspecified COPD type  Goal:	Pt. is stable  Instructions for follow-up, activity and diet:	Call your Health Care provider upon arrival home to make a follow up appointment within one week.  Take all inhalers as prescribed by your Health Care Provider.  Take steroids as prescribed by your Health Care Provider.  If your cough increases infrequency and severity and/or you have shortness of breath or increased shortness of breath call your Health Care Provider.  If you develop fever, chills, night sweats, malaise, and/or change in mental status call your Health care Provider.  Nutrition is very important.  Eat small frequent meals.  Increase your activity as tolerated.  Do not stay in bed all day  Secondary Diagnosis:	Aspiration pneumonia, unspecified aspiration pneumonia type, unspecified laterality, unspecified part of lung  Goal:	Pt. is stable.  Instructions for follow-up, activity and diet:	Take all antibiotics as ordered.  Call you Health care provider upon arrival home to make a one week follow up appointment.  If you develop fever, chills, malaise, or change in mental status call your Health Care Provider or go to the Emergency Department.  Nutrition is important, eat small frequent meals to help ensure you get adequate calories.  Do not stay in bed all day!  Increase your activity daily as tolerated.

## 2017-04-07 NOTE — DISCHARGE NOTE ADULT - HOSPITAL COURSE
Attending will complete 87 y/o M w/ PMHx of CABG in [place], PAF on Coumadin, past PCI, PPM, reported COPD (no prior primary or secondary tobacco exposure) but from a childhood complication of empyema, w/ the spouse noting that pt has been c/o occipital H/A since this Sunday. Pt reports that he fell 2 days ago and yesterday a total of 4 times--spouse notes that the third time the pt fell on the back of his head, and with falls to his RIGHT leg.  Spouse notes that the last episode yesterday pt had fallen in the BR and pt had accidentally blocked himself against the door but was able to free himself from the door.      Dx Fib , fall  The small areas of subarachnoid hemorrhage in the left sylvian fissure   are stable in size. No new additional hemorrhages are noted over the time   interval.Pt received his Coumadin dose from last night 3/30 by spouse    3/31   Pt had  a  Fever 102.6. Panculture  04/1 Zosyn added for ? aspiration Pneumonia              Toprol doses increased for better bp control  4/3 - As per Speech and swallow recommend NPO.     - Updated diet from Dysphagia I to Dysphagia II as per pts family request , risk of aspiration informed    - Contiued aspiration prevention protocols     - Began Lovenox 1.5 mg/kg QD and Coumadin 4 mg PO, risk for bleeding informed with coumadin given recurrent falls, pts wife wanted coumadin stating that she doesnt want pt to get strokes     -pts mental status was declining repeat ct head with ICH , evaluated by neruosx - no acute intervention, anticoagulation was discontinued    d/valente pt after PT eval/ neuro, neurosx, cards clearance

## 2017-04-07 NOTE — SWALLOW BEDSIDE ASSESSMENT ADULT - SLP PRECAUTIONS/LIMITATIONS: HEARING
Mildly to Moderately Impaired: difficulty hearing in some environments or speaker may need to increase volume or speak distinctly
Mildly to Moderately Impaired: difficulty hearing in some environments or speaker may need to increase volume or speak distinctly

## 2017-04-07 NOTE — DISCHARGE NOTE ADULT - MEDICATION SUMMARY - MEDICATIONS TO CHANGE
I will SWITCH the dose or number of times a day I take the medications listed below when I get home from the hospital:    hydrALAZINE 50 mg oral tablet  -- 1 tab(s) by mouth 2 times a day    metoprolol succinate 25 mg oral tablet, extended release  -- 1 tab(s) by mouth once a day    raNITIdine 150 mg oral tablet  -- 1 tab(s) by mouth 2 times a day    losartan 25 mg oral tablet  -- 1 tab(s) by mouth 2 times a day

## 2017-04-07 NOTE — DISCHARGE NOTE ADULT - MEDICATION SUMMARY - MEDICATIONS TO TAKE
I will START or STAY ON the medications listed below when I get home from the hospital:    acetaminophen 325 mg oral tablet  -- 2 tab(s) by mouth every 6 hours, As needed, Mild Pain (1 - 3)  -- Indication: For Pain    losartan 100 mg oral tablet  -- 1 tab(s) by mouth once a day  -- Indication: For HTN (hypertension)    Cardura 2 mg oral tablet  -- 1 tab(s) by mouth once a day (at bedtime)  -- Indication: For BPH    simvastatin 40 mg oral tablet  -- 1 tab(s) by mouth once a day (at bedtime)  -- Indication: For High cholesterol    metoprolol succinate 50 mg oral tablet, extended release  -- 1 tab(s) by mouth once a day  -- Indication: For Paroxysmal atrial fibrillation    Symbicort 160 mcg-4.5 mcg/inh inhalation aerosol  -- 2 puff(s) inhaled 2 times a day  -- Indication: For COPD (chronic obstructive pulmonary disease)    Norvasc 5 mg oral tablet  -- 1 tab(s) by mouth once a day  -- Indication: For HTN (hypertension)    famotidine 20 mg oral tablet  -- 1 tab(s) by mouth once a day  -- Indication: For GERD    hydrALAZINE 100 mg oral tablet  -- 1 tab(s) by mouth 3 times a day  -- Indication: For HTN (hypertension)

## 2017-04-07 NOTE — DISCHARGE NOTE ADULT - PATIENT PORTAL LINK FT
“You can access the Follow[facility] Patient Portal, offered by [facility], by registering with the following website: [email or web address]”

## 2017-04-07 NOTE — SWALLOW BEDSIDE ASSESSMENT ADULT - SLP PRECAUTIONS/LIMITATIONS: VISION
Partially impaired: cannot see medication labels or newsprint, but can see obstacles in path, and the surrounding layout; can count fingers at arm's length
Partially impaired: cannot see medication labels or newsprint, but can see obstacles in path, and the surrounding layout; can count fingers at arm's length

## 2017-04-07 NOTE — SWALLOW BEDSIDE ASSESSMENT ADULT - COMMENTS
Hx contd: Per Assessment Presentation of pt w/ intermittent occipital H/A since this past Sunday (no reported prior trauma until 2 days prior) in the setting of pt with PAF on Coumadin, CABG, PPM, essential HTN on multiple medications for BP control. Pt had already received his Coumadin dose from last night by spouse--spouse aware and agrees to risks/benefits of continued anticoagulation in the setting of PAF- formal neuro eval requested. MRI is precluded due to pt's PPM. Would consider EP assessment of PPM. Pt with no Rx that should explain the confusional state nor presently have evidence of occult infection as such>>will follow orthostatics in the setting of BP medications. Will assume aspiration risk. PT/ fall and bleeding precautions. Enhanced supervision. Will obtain an echo to assess aortic valves and LV function. Pt's multiple comorbidities in pt's presentation with guarded prognosis. Dx: Frequent falls, Headache, PAF. HC: 3/30 CXR: Clear lungs. HEAD CT: No acute territorial infarct, hemorrhage, mass or mass effect. C-SPINE CT: Degenerative changes without evidence of a fracture. 3/31 Seen by Cards, rx neuro eval to see if there is a reversible cause to frequent falls. C/w coumadin, ASA, antiHTN meds. Check echo. Seen by Neuro, possible Parkinson's disease given asymmetric tremor. Rx repeat CT Head, consider trial of sinemet. 4/1 Per provider contact note Elevated Rectal Temp, Mews 7. NP ordered tylenol suppository for fever, RN adminstered. NP ordered RN to reasses Rectal temp in an hour NP ordered blood cultures, lactate, UA and urine culture. Will  continue to monitor to monitor. Per cards, PPM interrogation appreciated SVT episode 12/2016, otherwise (-). CE (-) thus far, CK elevated 2/2 fall but trop (-). Rx check TTE, monitor electrolytes.

## 2017-04-07 NOTE — SWALLOW BEDSIDE ASSESSMENT ADULT - SLP PERTINENT HISTORY OF CURRENT PROBLEM
85 yo M previously a resident of [place] but has resided in [place] for the past 10 yrs w/ h/o remote CABG in [place], PAF on Coumadin, past PCI, PPM, reported COPD (no prior primary or secondary tobacco exposure) but from a childhood complication of empyema, with spouse noting that pt has been c/o an occipital H/A since this Sunday, w/ the pt and spouse arriving in [place] this Tuesday for a local wedding. Pt reports that he fell 2 days ago and yesterday a total of 4 times--per spouse the 3rd time pt fell on the back of his head, and w/ falls to his R leg. Pt denies LOC. NO N/V. No HA at present, no focal weakness. Spouse notes that the last episode yesterday pt had fallen in the [place] and pt had accidentally blocked himself against the door but was able to free himself from the door. Denies weight loss or anorexia. On exam A&O to person/place/year. Poor short term memory recall. Interactive with examiner with no need for prompting but limited cognitive assessment.
87 yo M previously a resident of [place] but has resided in [place] for the past 10 yrs w/ h/o remote CABG in [place], PAF on Coumadin, past PCI, PPM, reported COPD (no prior primary or secondary tobacco exposure) but from a childhood complication of empyema, with spouse noting that pt has been c/o an occipital H/A since this Sunday, w/ the pt and spouse arriving in [place] this Tuesday for a local wedding. Pt reports that he fell 2 days ago and yesterday a total of 4 times--per spouse the 3rd time pt fell on the back of his head, and w/ falls to his R leg. Pt denies LOC. NO N/V. No HA at present, no focal weakness. Spouse notes that the last episode yesterday pt had fallen in the [place] and pt had accidentally blocked himself against the door but was able to free himself from the door. Denies weight loss or anorexia. On exam A&O to person/place/year. Poor short term memory recall. Interactive with examiner with no need for prompting but limited cognitive assessment.

## 2017-04-07 NOTE — SWALLOW BEDSIDE ASSESSMENT ADULT - SWALLOW EVAL: DIAGNOSIS
Consult for bedside swallow evaluation received and appreciated. Pt s/p MBS on 4/3 with recommendation for NPO, with non-oral nutrition/hydration/medications. Noted per EMR pt continued on oral diet. Case d/w MARISOL Cruz re clinical indication for repeat assessment. This service unable to subjectively assess pt at the bedside given h/o silent aspiration. If clinically indicated, pt would require repeat objective assessment. PA to d/w attending physician.
Patient presents with suspected pharyngeal dysphagia characterized by baseline throat clearing with c/o pharyngeal stasis of secretions, delayed pharyngeal swallow trigger, reduced hyolaryngeal elevation upon palpation, repeat swallows suggestive of pharyngeal retention, and s/s of laryngeal penetration and/or aspiration with conservative PO textures.

## 2017-04-07 NOTE — DISCHARGE NOTE ADULT - ADDITIONAL INSTRUCTIONS
Follow up with speech and swallow outpatient. Follow up with speech and swallow outpatient.  Please follow up outpatient with Coumadin and aspirin restart by cardiology / Neurology

## 2017-04-07 NOTE — DISCHARGE NOTE ADULT - PLAN OF CARE
Pt. is stable. Fall precautions. Follow up with neurology outpatient after rehab discharge Fall precautions Continue Blood pressure medications. Pt. is stable. Hold anticoagulation secondary to subacute hemorrhage. follow up with cardiology outpt. Pt. is stable Follow up with neurology outpatient after rehab discharge. All anticoagulation is currently discontinued. Follow up with primary doctor. Repeat CT scan in one month. Please follow up with Neurology outpatient Low salt diet  Activity as tolerated.  Take all medication as prescribed.  Follow up with your medical doctor for routine blood pressure monitoring at your next visit.  Notify your doctor if you have any of the following symptoms:   Dizziness, Lightheadedness, Blurry vision, Headache, Chest pain, Shortness of breath Call your Health Care provider upon arrival home to make a follow up appointment within one week.  Take all inhalers as prescribed by your Health Care Provider.  Take steroids as prescribed by your Health Care Provider.  If your cough increases infrequency and severity and/or you have shortness of breath or increased shortness of breath call your Health Care Provider.  If you develop fever, chills, night sweats, malaise, and/or change in mental status call your Health care Provider.  Nutrition is very important.  Eat small frequent meals.  Increase your activity as tolerated.  Do not stay in bed all day Take all antibiotics as ordered.  Call you Health care provider upon arrival home to make a one week follow up appointment.  If you develop fever, chills, malaise, or change in mental status call your Health Care Provider or go to the Emergency Department.  Nutrition is important, eat small frequent meals to help ensure you get adequate calories.  Do not stay in bed all day!  Increase your activity daily as tolerated. fall precaution Pt. is stable. possible related to fall. Fall precautions. Hold anticoagulation secondary to subacute hemorrhage. follow up with cardiology outpt.  continue with beta blocker Follow up with neurology outpatient after rehab discharge. All anticoagulation is currently discontinued. Follow up with primary doctor. Repeat CT scan in one month.  no neuro intervention    PT in rehab

## 2017-04-07 NOTE — DISCHARGE NOTE ADULT - CARE PROVIDERS DIRECT ADDRESSES
[email or web address],DirectAddress_Unknown,DirectAddress_Unknown

## 2017-04-07 NOTE — DISCHARGE NOTE ADULT - CARE PROVIDER_API CALL
Cruz Billingsley (MD; PhD), Neurological Surgery  [address] [place] [address]  Phone: [phone]  Fax: [phone]    Boby Rodas), Internal Medicine  [address] [place] [address]  Phone: [phone]  Fax: [phone]    Adin Melgar (DO), Neurology  [address] [place] [address]  Phone: [phone]  Fax: [phone]

## 2017-04-17 PROBLEM — E78.00 PURE HYPERCHOLESTEROLEMIA, UNSPECIFIED: Chronic | Status: ACTIVE | Noted: 2017-03-30

## 2017-04-17 PROBLEM — Z95.5 PRESENCE OF CORONARY ANGIOPLASTY IMPLANT AND GRAFT: Chronic | Status: ACTIVE | Noted: 2017-03-30

## 2017-04-17 PROBLEM — R01.1 CARDIAC MURMUR, UNSPECIFIED: Chronic | Status: ACTIVE | Noted: 2017-03-30

## 2017-04-17 PROBLEM — I10 ESSENTIAL (PRIMARY) HYPERTENSION: Chronic | Status: ACTIVE | Noted: 2017-03-30

## 2017-04-17 PROBLEM — Z95.0 PRESENCE OF CARDIAC PACEMAKER: Chronic | Status: ACTIVE | Noted: 2017-03-30

## 2017-04-17 PROBLEM — J44.9 CHRONIC OBSTRUCTIVE PULMONARY DISEASE, UNSPECIFIED: Chronic | Status: ACTIVE | Noted: 2017-03-30

## 2017-04-19 ENCOUNTER — APPOINTMENT (OUTPATIENT)
Dept: CT IMAGING | Facility: CLINIC | Age: 82
End: 2017-04-19

## 2017-04-19 ENCOUNTER — OUTPATIENT (OUTPATIENT)
Dept: OUTPATIENT SERVICES | Facility: HOSPITAL | Age: 82
LOS: 1 days | End: 2017-04-19
Payer: MEDICARE

## 2017-04-19 DIAGNOSIS — Z00.8 ENCOUNTER FOR OTHER GENERAL EXAMINATION: ICD-10-CM

## 2017-04-19 DIAGNOSIS — Z95.1 PRESENCE OF AORTOCORONARY BYPASS GRAFT: Chronic | ICD-10-CM

## 2017-04-19 DIAGNOSIS — Z98.890 OTHER SPECIFIED POSTPROCEDURAL STATES: Chronic | ICD-10-CM

## 2017-04-19 PROCEDURE — 70450 CT HEAD/BRAIN W/O DYE: CPT

## 2017-12-04 NOTE — ED PROVIDER NOTE - PSH
deformity. No tenderness found. Right ankle: Normal. He exhibits normal range of motion and no deformity. No tenderness. Achilles tendon normal.        Left ankle: Normal. He exhibits normal range of motion and no deformity. No tenderness. Achilles tendon normal.   Lymphadenopathy:        Head (right side): No submental, no tonsillar and no occipital adenopathy present. Head (left side): No submental, no tonsillar and no occipital adenopathy present. He has no cervical adenopathy. He has no axillary adenopathy. Right: No inguinal adenopathy present. Left: No inguinal adenopathy present. Neurological: He is alert and oriented to person, place, and time. He has normal strength. He displays normal reflexes. No cranial nerve deficit or sensory deficit. He exhibits normal muscle tone. He displays a negative Romberg sign. Coordination and gait normal.   Skin: Skin is warm, dry and intact. No rash noted. He is not diaphoretic. No cyanosis or erythema. No pallor. Nails show no clubbing. Psychiatric: His speech is normal and behavior is normal. Judgment and thought content normal. His mood appears anxious. His affect is not angry. Cognition and memory are normal. He does not exhibit a depressed mood. 1. Routine general medical examination at a health care facility  Age appropriate teaching done. Continue all treatments. Immunizations and health maintenance as needed   POCT Urinalysis no Micro    CBC Auto Differential    Comprehensive Metabolic Panel    Lipid Panel    TSH without Reflex    Vitamin D 25 Hydroxy   2. Generalized anxiety disorder  The condition is deteriorating, will change treatment, investigate cause and make further recommendations when data back. Need to treat  PARoxetine (PAXIL) 10 MG tablet    Ambulatory referral to Psychology   3.  Impacted cerumen of right ear  The condition is deteriorating, will change treatment, investigate cause and make further History of back surgery    S/P CABG x 3 recommendations when data back. Removed     4. Asymptomatic microscopic hematuria  The condition is deteriorating, will change treatment, investigate cause and make further recommendations when data back. 5. Screening for HIV (human immunodeficiency virus)  HIV Screen   6. Need for Tdap vaccination  Tdap (age 10y-63y) IM (Adacel)   9.  Abnormal urine finding  Urine Culture             Rocael Rosa MD

## 2024-10-28 NOTE — SWALLOW BEDSIDE ASSESSMENT ADULT - SWALLOW EVAL: PATIENT/FAMILY GOALS STATEMENT
Patient informed of new start time 7:30a, arrival by 5:30a   Pt endorses dysphagia for 8-9 months "or more" PTA. Per pt secretions build up within pharynx, pt is unable to bring them up to expectorate as well as unable to swallow them, has frequent "choking fits" with same. Also reports choking fits with thin liquids. Reports feelings of pharyngeal stasis with solids. Pt with ? h/o laryngoscopy, unclear for what purpose, findings unknown. Pt reports h/o GERD however does not take medications for same. Also reports h/o CARTER, reports use of nocturnal CPAP. Pt endorses dysphagia for 8-9 months "or more" PTA. Per pt secretions build up within pharynx, pt is unable to bring them up to expectorate as well as unable to swallow them, has frequent "choking fits" with same. Also reports choking fits with thin liquids. Reports feelings of pharyngeal stasis with solids. Pt with ? h/o laryngoscopy, unclear for what purpose, findings unknown. Pt reports h/o GERD however does not take medications for same. Also reports h/o CARTER, reports use of nocturnal CPAP. NP Gurjit made aware of h/o CARTER and CPAP use.